# Patient Record
Sex: MALE | Race: WHITE | Employment: FULL TIME | URBAN - METROPOLITAN AREA
[De-identification: names, ages, dates, MRNs, and addresses within clinical notes are randomized per-mention and may not be internally consistent; named-entity substitution may affect disease eponyms.]

---

## 2022-12-20 ENCOUNTER — OFFICE VISIT (OUTPATIENT)
Dept: FAMILY MEDICINE | Facility: CLINIC | Age: 52
End: 2022-12-20
Payer: COMMERCIAL

## 2022-12-20 VITALS
SYSTOLIC BLOOD PRESSURE: 154 MMHG | BODY MASS INDEX: 35.04 KG/M2 | WEIGHT: 236.6 LBS | HEIGHT: 69 IN | OXYGEN SATURATION: 99 % | HEART RATE: 79 BPM | TEMPERATURE: 98.4 F | DIASTOLIC BLOOD PRESSURE: 96 MMHG

## 2022-12-20 DIAGNOSIS — E87.6 HYPOKALEMIA: ICD-10-CM

## 2022-12-20 DIAGNOSIS — E11.9 TYPE 2 DIABETES MELLITUS WITHOUT COMPLICATION, WITHOUT LONG-TERM CURRENT USE OF INSULIN (H): ICD-10-CM

## 2022-12-20 DIAGNOSIS — I63.9 ISCHEMIC STROKE (H): ICD-10-CM

## 2022-12-20 DIAGNOSIS — I63.9 CEREBROVASCULAR ACCIDENT (CVA), UNSPECIFIED MECHANISM (H): ICD-10-CM

## 2022-12-20 DIAGNOSIS — Z09 HOSPITAL DISCHARGE FOLLOW-UP: Primary | ICD-10-CM

## 2022-12-20 DIAGNOSIS — Z23 NEED FOR PROPHYLACTIC VACCINATION AND INOCULATION AGAINST INFLUENZA: ICD-10-CM

## 2022-12-20 DIAGNOSIS — I10 UNCONTROLLED HYPERTENSION: ICD-10-CM

## 2022-12-20 LAB
ANION GAP SERPL CALCULATED.3IONS-SCNC: 3 MMOL/L (ref 3–14)
BUN SERPL-MCNC: 17 MG/DL (ref 7–30)
CALCIUM SERPL-MCNC: 9.2 MG/DL (ref 8.5–10.1)
CHLORIDE BLD-SCNC: 108 MMOL/L (ref 94–109)
CO2 SERPL-SCNC: 27 MMOL/L (ref 20–32)
CREAT SERPL-MCNC: 0.8 MG/DL (ref 0.66–1.25)
GFR SERPL CREATININE-BSD FRML MDRD: >90 ML/MIN/1.73M2
GLUCOSE BLD-MCNC: 226 MG/DL (ref 70–99)
POTASSIUM BLD-SCNC: 4.2 MMOL/L (ref 3.4–5.3)
SODIUM SERPL-SCNC: 138 MMOL/L (ref 133–144)

## 2022-12-20 PROCEDURE — 80048 BASIC METABOLIC PNL TOTAL CA: CPT | Performed by: FAMILY MEDICINE

## 2022-12-20 PROCEDURE — 36415 COLL VENOUS BLD VENIPUNCTURE: CPT | Performed by: FAMILY MEDICINE

## 2022-12-20 PROCEDURE — 90471 IMMUNIZATION ADMIN: CPT | Performed by: FAMILY MEDICINE

## 2022-12-20 PROCEDURE — 90682 RIV4 VACC RECOMBINANT DNA IM: CPT | Performed by: FAMILY MEDICINE

## 2022-12-20 PROCEDURE — 99495 TRANSJ CARE MGMT MOD F2F 14D: CPT | Performed by: FAMILY MEDICINE

## 2022-12-20 RX ORDER — BLOOD SUGAR DIAGNOSTIC
STRIP MISCELLANEOUS
COMMUNITY
Start: 2022-12-16 | End: 2023-01-06

## 2022-12-20 RX ORDER — NIFEDIPINE 60 MG/1
60 TABLET, EXTENDED RELEASE ORAL
COMMUNITY
Start: 2022-12-18 | End: 2023-01-06

## 2022-12-20 RX ORDER — ASPIRIN 325 MG
325 TABLET ORAL
COMMUNITY
Start: 2022-12-18

## 2022-12-20 RX ORDER — ATORVASTATIN CALCIUM 80 MG/1
80 TABLET, FILM COATED ORAL AT BEDTIME
COMMUNITY
Start: 2022-12-17 | End: 2023-01-06

## 2022-12-20 RX ORDER — POTASSIUM CHLORIDE 1500 MG/1
40 TABLET, EXTENDED RELEASE ORAL 2 TIMES DAILY
COMMUNITY
Start: 2022-12-17 | End: 2023-01-06

## 2022-12-20 RX ORDER — LOSARTAN POTASSIUM 50 MG/1
50 TABLET ORAL DAILY
Qty: 90 TABLET | Refills: 0 | Status: SHIPPED | OUTPATIENT
Start: 2022-12-20 | End: 2023-01-06

## 2022-12-20 RX ORDER — CLOPIDOGREL BISULFATE 75 MG/1
75 TABLET ORAL EVERY MORNING
COMMUNITY
Start: 2022-12-18 | End: 2023-01-06

## 2022-12-20 RX ORDER — LOSARTAN POTASSIUM 25 MG/1
25 TABLET ORAL DAILY
COMMUNITY
Start: 2022-12-18 | End: 2022-12-20

## 2022-12-20 NOTE — PATIENT INSTRUCTIONS
Nikolai    It was a pleasure seeing you in clinic today.  Here's the plan:    Diabetes - increase insulin to 20U then increase by 2units every 2 days until fasting glucose is 130 or less.  Plan to increase metformin in about 2 weeks.  Referral to MTM (jennifer lemons)  Hypertension - increase losartan to 50mg once daily, continue to monitor twice daily, goal is less than 130/80  Neurology appointment  Cardiology referral ordered  Potassium check today  PT/OT/ST    Let me know if you have questions.    Ravi Prajapati MD

## 2022-12-20 NOTE — LETTER
December 22, 2022    Nikolai Kincaid  [phone]  [place] [address]          Dear ,    We are writing to inform you of your test results.    Your results are overall not concerning.        Resulted Orders   Basic metabolic panel  (Ca, Cl, CO2, Creat, Gluc, K, Na, BUN)   Result Value Ref Range    Sodium 138 133 - 144 mmol/L    Potassium 4.2 3.4 - 5.3 mmol/L    Chloride 108 94 - 109 mmol/L    Carbon Dioxide (CO2) 27 20 - 32 mmol/L    Anion Gap 3 3 - 14 mmol/L    Urea Nitrogen 17 7 - 30 mg/dL    Creatinine 0.80 0.66 - 1.25 mg/dL    Calcium 9.2 8.5 - 10.1 mg/dL    Glucose 226 (H) 70 - 99 mg/dL    GFR Estimate >90 >60 mL/min/1.73m2      Comment:      Effective December 21, 2021 eGFRcr in adults is calculated using the 2021 CKD-EPI creatinine equation which includes age and gender (Annabel et al., NEJM, DOI: 10.1056/OQXRsv7415464)       If you have any questions or concerns, please call the clinic at the number listed above.       Sincerely,      Ravi Oneill MD

## 2022-12-20 NOTE — PROGRESS NOTES
Assessment & Plan       ICD-10-CM    1. Uncontrolled hypertension  I10 Med Therapy Management Referral     Adult Cardiology Eval  Referral     losartan (COZAAR) 50 MG tablet      2. Type 2 diabetes mellitus without complication, without long-term current use of insulin (H)  E11.9 Med Therapy Management Referral      3. Hypokalemia  E87.6 Basic metabolic panel  (Ca, Cl, CO2, Creat, Gluc, K, Na, BUN)     Basic metabolic panel  (Ca, Cl, CO2, Creat, Gluc, K, Na, BUN)      4. Cerebrovascular accident (CVA), unspecified mechanism (H)  I63.9 Adult Cardiology Eval  Referral     CANCELED: Adult Neurology  Referral      5. Ischemic stroke (H)  I63.9 Adult Cardiology Eval  Referral      6. Need for prophylactic vaccination and inoculation against influenza  Z23 INFLUENZA QUAD, RECOMBINANT, P-FREE (RIV4) (FLUBLOK)            Review of external notes as documented elsewhere in note        Patient Instructions   Nikolai    It was a pleasure seeing you in clinic today.  Here's the plan:    1. Diabetes - increase insulin to 20U then increase by 2units every 2 days until fasting glucose is 130 or less.  Plan to increase metformin in about 2 weeks.  Referral to MTM (jennifer lemons)  2. Hypertension - increase losartan to 50mg once daily, continue to monitor twice daily, goal is less than 130/80  3. Neurology appointment  4. Cardiology referral ordered  5. Potassium check today  6. PT/OT/ST    Let me know if you have questions.    Ravi Prajapati MD        No follow-ups on file.    Ravi Prajapati MD  [facility]    Angela Menchaca is a 52 year old, presenting for the following health issues:  Hospital F/U      [facility]       Hospital Follow-up Visit:    Hospital/Nursing Home/IP Rehab Facility: [facility]  Date of Admission: 12/12/2022  Date of Discharge: 12/17/2022  Reason(s) for Admission: Memory loss    Was your hospitalization related to COVID-19? No   Problems taking  "medications regularly:  None  Medication changes since discharge:   Problems adhering to non-medication therapy:  None    Summary of hospitalization:  [facility] discharge summary reviewed  Diagnostic Tests/Treatments reviewed.  Follow up needed: none  Other Healthcare Providers Involved in Patient s Care:         None  Update since discharge: improved.       Nonischemic cardiomyopathy - EF 15-20? Repeat echo was normal  Diabetes - A1C 10.7 - lantus 14units, metformin 500mg BID  Fasting glucose low 200's  Hypertension - procardiaXL and losartan  Alcohol use  Stroke - PT/OT/SLP - needs coverage for speech - , plavix x 3 months, negative CIRO  Hypokalemia - needs recheck - 3.3 12/17    Work - will need letter to go back  Plan of care communicated with patient                 Review of Systems   Constitutional, HEENT, cardiovascular, pulmonary, gi and gu systems are negative, except as otherwise noted.      Objective    BP (!) 154/96   Pulse 79   Temp 98.4  F (36.9  C) (Oral)   Ht 1.753 m (5' 9\")   Wt 107.3 kg (236 lb 9.6 oz)   SpO2 99%   BMI 34.94 kg/m    Body mass index is 34.94 kg/m .  Physical Exam  Constitutional:       General: He is not in acute distress.     Appearance: Normal appearance. He is well-developed. He is not ill-appearing.   HENT:      Head: Normocephalic and atraumatic.      Right Ear: External ear normal.      Left Ear: External ear normal.      Nose: Nose normal.   Eyes:      General: No scleral icterus.     Extraocular Movements: Extraocular movements intact.      Conjunctiva/sclera: Conjunctivae normal.   Cardiovascular:      Rate and Rhythm: Normal rate.   Pulmonary:      Effort: Pulmonary effort is normal.   Musculoskeletal:      Cervical back: Normal range of motion and neck supple.   Skin:     General: Skin is warm and dry.   Neurological:      Mental Status: He is alert and oriented to person, place, and time.   Psychiatric:         Behavior: Behavior normal.    "      Thought Content: Thought content normal.         Judgment: Judgment normal.

## 2022-12-22 ENCOUNTER — TELEPHONE (OUTPATIENT)
Dept: FAMILY MEDICINE | Facility: CLINIC | Age: 52
End: 2022-12-22

## 2022-12-22 NOTE — TELEPHONE ENCOUNTER
MTM referral from: [facility] visit (referral by provider)    MTM referral outreach attempt #2 on December 22, 2022 at 3:56 PM      Outcome: Patient is not interested at this time because his insurance doesn't cover the appt, will route to MTM Pharmacist/Provider as an FYI. Thank you for the referral.     Yesenia Callaway [facility]

## 2022-12-27 ENCOUNTER — TELEPHONE (OUTPATIENT)
Dept: FAMILY MEDICINE | Facility: CLINIC | Age: 52
End: 2022-12-27

## 2022-12-27 NOTE — TELEPHONE ENCOUNTER
Spoke with Onelia, clinician calling from [facility] regarding recent lab results from 12/16.    She is inquiring regarding aldosterone labs and renin labs. Patient's aldosterone labs resulted as normal, however renin was abnormal. Labs faxed to [facility] team at [phone].    Onelia suggesting a consult for endocrinology or nephrology due to concern for potential hypoaldosteronism.     Please advise.     Krunal Hobbs RN  [facility]

## 2022-12-28 PROBLEM — I63.9 ACUTE CVA (CEREBROVASCULAR ACCIDENT) (H): Status: ACTIVE | Noted: 2022-12-12

## 2022-12-28 PROBLEM — E87.6 HYPOKALEMIA: Status: ACTIVE | Noted: 2022-12-17

## 2022-12-28 PROBLEM — E11.9 TYPE 2 DIABETES MELLITUS, WITHOUT LONG-TERM CURRENT USE OF INSULIN (H): Status: ACTIVE | Noted: 2022-12-12

## 2022-12-28 PROBLEM — E78.5 HYPERLIPIDEMIA WITH TARGET LDL LESS THAN 70: Status: ACTIVE | Noted: 2017-03-03

## 2022-12-28 PROBLEM — I16.1 HYPERTENSIVE EMERGENCY: Status: ACTIVE | Noted: 2022-12-12

## 2023-01-06 ENCOUNTER — OFFICE VISIT (OUTPATIENT)
Dept: FAMILY MEDICINE | Facility: CLINIC | Age: 53
End: 2023-01-06
Payer: COMMERCIAL

## 2023-01-06 VITALS
BODY MASS INDEX: 34.82 KG/M2 | HEART RATE: 86 BPM | TEMPERATURE: 98.4 F | DIASTOLIC BLOOD PRESSURE: 86 MMHG | OXYGEN SATURATION: 97 % | WEIGHT: 235.8 LBS | SYSTOLIC BLOOD PRESSURE: 138 MMHG

## 2023-01-06 DIAGNOSIS — E11.59 TYPE 2 DIABETES MELLITUS WITH OTHER CIRCULATORY COMPLICATION, WITHOUT LONG-TERM CURRENT USE OF INSULIN (H): ICD-10-CM

## 2023-01-06 DIAGNOSIS — Z12.11 SCREEN FOR COLON CANCER: ICD-10-CM

## 2023-01-06 DIAGNOSIS — E11.59 TYPE 2 DIABETES MELLITUS WITH OTHER CIRCULATORY COMPLICATION, WITHOUT LONG-TERM CURRENT USE OF INSULIN (H): Primary | ICD-10-CM

## 2023-01-06 DIAGNOSIS — Z11.4 SCREENING FOR HIV (HUMAN IMMUNODEFICIENCY VIRUS): ICD-10-CM

## 2023-01-06 DIAGNOSIS — I10 UNCONTROLLED HYPERTENSION: ICD-10-CM

## 2023-01-06 DIAGNOSIS — Z23 HIGH PRIORITY FOR 2019-NCOV VACCINE: ICD-10-CM

## 2023-01-06 DIAGNOSIS — Z11.59 NEED FOR HEPATITIS C SCREENING TEST: ICD-10-CM

## 2023-01-06 LAB
CREAT UR-MCNC: 234 MG/DL
MICROALBUMIN UR-MCNC: 209 MG/L
MICROALBUMIN/CREAT UR: 89.32 MG/G CR (ref 0–17)

## 2023-01-06 PROCEDURE — 82043 UR ALBUMIN QUANTITATIVE: CPT | Performed by: FAMILY MEDICINE

## 2023-01-06 PROCEDURE — 91312 COVID-19 VACCINE BIVALENT BOOSTER 12+ (PFIZER): CPT | Performed by: FAMILY MEDICINE

## 2023-01-06 PROCEDURE — 82570 ASSAY OF URINE CREATININE: CPT | Performed by: FAMILY MEDICINE

## 2023-01-06 PROCEDURE — 99214 OFFICE O/P EST MOD 30 MIN: CPT | Mod: 25 | Performed by: FAMILY MEDICINE

## 2023-01-06 PROCEDURE — 0124A COVID-19 VACCINE BIVALENT BOOSTER 12+ (PFIZER): CPT | Performed by: FAMILY MEDICINE

## 2023-01-06 RX ORDER — NIFEDIPINE 60 MG/1
60 TABLET, EXTENDED RELEASE ORAL
Qty: 90 TABLET | Refills: 1 | Status: SHIPPED | OUTPATIENT
Start: 2023-01-06 | End: 2023-07-05

## 2023-01-06 RX ORDER — POTASSIUM CHLORIDE 1500 MG/1
40 TABLET, EXTENDED RELEASE ORAL 2 TIMES DAILY
Qty: 180 TABLET | Refills: 1 | Status: SHIPPED | OUTPATIENT
Start: 2023-01-06 | End: 2023-04-17

## 2023-01-06 RX ORDER — ATORVASTATIN CALCIUM 80 MG/1
80 TABLET, FILM COATED ORAL AT BEDTIME
Qty: 90 TABLET | Refills: 1 | Status: SHIPPED | OUTPATIENT
Start: 2023-01-06 | End: 2023-07-11

## 2023-01-06 RX ORDER — LANCETS
EACH MISCELLANEOUS
Qty: 1 EACH | Refills: 3 | Status: SHIPPED | OUTPATIENT
Start: 2023-01-06 | End: 2024-09-23

## 2023-01-06 RX ORDER — LOSARTAN POTASSIUM 50 MG/1
50 TABLET ORAL DAILY
Qty: 90 TABLET | Refills: 1 | Status: SHIPPED | OUTPATIENT
Start: 2023-01-06 | End: 2023-04-05

## 2023-01-06 RX ORDER — CLOPIDOGREL BISULFATE 75 MG/1
75 TABLET ORAL EVERY MORNING
Qty: 90 TABLET | Refills: 1 | Status: SHIPPED | OUTPATIENT
Start: 2023-01-06 | End: 2023-07-11

## 2023-01-06 RX ORDER — BLOOD SUGAR DIAGNOSTIC
STRIP MISCELLANEOUS
Qty: 400 STRIP | Refills: 1 | Status: SHIPPED | OUTPATIENT
Start: 2023-01-06

## 2023-01-06 RX ORDER — GLUCOSAMINE HCL/CHONDROITIN SU 500-400 MG
CAPSULE ORAL
Qty: 100 EACH | Refills: 3 | Status: SHIPPED | OUTPATIENT
Start: 2023-01-06

## 2023-01-06 NOTE — LETTER
January 9, 2023    Nikolai Kincaid  [address]  [place] [address]          Dear ,    We are writing to inform you of your test results.  Urine albumin is slightly elevated, continue to monitor yearly.       Resulted Orders   Albumin Random Urine Quantitative with Creat Ratio   Result Value Ref Range    Creatinine Urine mg/dL 234 mg/dL    Albumin Urine mg/L 209 mg/L    Albumin Urine mg/g Cr 89.32 (H) 0.00 - 17.00 mg/g Cr       If you have any questions or concerns, please call the clinic at the number listed above.       Sincerely,      Ravi Oneill MD

## 2023-01-06 NOTE — LETTER
January 6, 2023      Nikolai Kincaid  [address]  [place] [address]        To Whom It May Concern:      Nikolai Kincaid was seen in our clinic. He may return to work full-time without restrictions.      Sincerely,        Ravi Prajapati MD

## 2023-01-06 NOTE — PROGRESS NOTES
"  Assessment & Plan       ICD-10-CM    1. Type 2 diabetes mellitus with other circulatory complication, without long-term current use of insulin (H)  E11.59 Albumin Random Urine Quantitative with Creat Ratio     REVIEW OF HEALTH MAINTENANCE PROTOCOL ORDERS     Adult Eye  Referral     atorvastatin (LIPITOR) 80 MG tablet     clopidogrel (PLAVIX) 75 MG tablet     losartan (COZAAR) 50 MG tablet     metFORMIN (GLUCOPHAGE) 1000 MG tablet     NIFEdipine ER OSMOTIC (PROCARDIA XL) 60 MG 24 hr tablet     potassium chloride ER (KLOR-CON M) 20 MEQ CR tablet     COLOGUARD(EXACT SCIENCES)     Med Therapy Management Referral     blood glucose (OPTIUM TEST STRIPS) test strip     Albumin Random Urine Quantitative with Creat Ratio      2. Screen for colon cancer  Z12.11       3. Screening for HIV (human immunodeficiency virus)  Z11.4       4. Need for hepatitis C screening test  Z11.59       5. Uncontrolled hypertension  I10 Albumin Random Urine Quantitative with Creat Ratio     REVIEW OF HEALTH MAINTENANCE PROTOCOL ORDERS     Adult Eye  Referral     atorvastatin (LIPITOR) 80 MG tablet     clopidogrel (PLAVIX) 75 MG tablet     losartan (COZAAR) 50 MG tablet     metFORMIN (GLUCOPHAGE) 1000 MG tablet     NIFEdipine ER OSMOTIC (PROCARDIA XL) 60 MG 24 hr tablet     potassium chloride ER (KLOR-CON M) 20 MEQ CR tablet     COLOGUARD(EXACT SCIENCES)     Med Therapy Management Referral     blood glucose (OPTIUM TEST STRIPS) test strip     thin (NO BRAND SPECIFIED) lancets     alcohol swab prep pads     Albumin Random Urine Quantitative with Creat Ratio      6. High priority for 2019-nCoV vaccine  Z23 COVID-19,PF,PFIZER BOOSTER BIVALENT 12+Yrs                     BMI:   Estimated body mass index is 34.82 kg/m  as calculated from the following:    Height as of 12/20/22: 1.753 m (5' 9\").    Weight as of this encounter: 107 kg (235 lb 12.8 oz).           No follow-ups on file.    Ravi Prajapati MD  [facility] " YOGESH Menchaca is a 52 year old, presenting for the following health issues:  Diabetes and Patient Request (Discuss going back to work)      History of Present Illness       Diabetes:   He presents for follow up of diabetes.  He is checking home blood glucose three times daily. He checks blood glucose before and after meals and at bedtime.  Blood glucose is sometimes over 200 and never under 70. When his blood glucose is low, the patient is asymptomatic for confusion, blurred vision, lethargy and reports not feeling dizzy, shaky, or weak.  He is concerned about blood sugar frequently over 200.  He is not experiencing numbness or burning in feet, excessive thirst, blurry vision, weight changes or redness, sores or blisters on feet. The patient has not had a diabetic eye exam in the last 12 months.         Hypertension: He presents for follow up of hypertension.  He does not check blood pressure  regularly outside of the clinic. Outside blood pressures have been over 140/90. He follows a low salt diet.       Diabetes  26 units  Fasting this morning wa 176  Gradual increase          Review of Systems   Constitutional, HEENT, cardiovascular, pulmonary, gi and gu systems are negative, except as otherwise noted.      Objective    BP (!) 156/99   Pulse 86   Temp 98.4  F (36.9  C) (Oral)   Wt 107 kg (235 lb 12.8 oz)   SpO2 97%   BMI 34.82 kg/m    Body mass index is 34.82 kg/m .  Physical Exam  Constitutional:       General: He is not in acute distress.     Appearance: Normal appearance. He is well-developed. He is not ill-appearing.   HENT:      Head: Normocephalic and atraumatic.      Right Ear: External ear normal.      Left Ear: External ear normal.      Nose: Nose normal.   Eyes:      General: No scleral icterus.     Extraocular Movements: Extraocular movements intact.      Conjunctiva/sclera: Conjunctivae normal.   Cardiovascular:      Rate and Rhythm: Normal rate.   Pulmonary:      Effort:  Pulmonary effort is normal.   Musculoskeletal:      Cervical back: Normal range of motion and neck supple.   Skin:     General: Skin is warm and dry.   Neurological:      Mental Status: He is alert and oriented to person, place, and time.   Psychiatric:         Behavior: Behavior normal.         Thought Content: Thought content normal.         Judgment: Judgment normal.

## 2023-01-10 ENCOUNTER — TELEPHONE (OUTPATIENT)
Dept: FAMILY MEDICINE | Facility: CLINIC | Age: 53
End: 2023-01-10

## 2023-01-10 NOTE — TELEPHONE ENCOUNTER
MTM referral from: [facility] visit (referral by provider)    MTM referral outreach attempt #1 on January 10, 2023 at 12:16 PM      Outcome: Patient declined due to private pay, will route to MTM Pharmacist/Provider as an FYI. Thank you for the referral.     Jackson Rodríguez, MTM coordinator    Reason for Referral:   diabetes

## 2023-01-11 NOTE — TELEPHONE ENCOUNTER
Hi Dr. Oneill - I won't be seeing him (see below), but can consider addition of Rybelsus or Victoza (do the same time every day with Lantus), these both appear to be on his formulary, although I don't know if he'll have a deductible.  Appears Ozempic, Trulicity and Jardiance are also on formulary for him.    Thanks!  Mary Ann Mayer, PharmD  Medication Therapy Management Pharmacist  [phone]

## 2023-03-14 ENCOUNTER — TELEPHONE (OUTPATIENT)
Dept: FAMILY MEDICINE | Facility: CLINIC | Age: 53
End: 2023-03-14
Payer: COMMERCIAL

## 2023-03-14 NOTE — TELEPHONE ENCOUNTER
Stroke NP saw pt yesterday for follow up.     176/100 - lowest BP that was taken after numerous attempts. Asymptomatic.     Current BP meds: losartan 50mg daily; nifedipine ER 60mg daily; as well as clopidogrel 75mg daily and ASA 325mg daily.     Had an ischemic stroke 12/12/22    Giving an update to see if you would like a med adjustment or to see patient.     Kavya Manuel RN  [facility]

## 2023-03-21 NOTE — TELEPHONE ENCOUNTER
Pam was notified of provider's message as written.     Left message on answering machine for patient to call back to the nurse at [phone].     Need to notify pt of Dr. Prajapati's message and assist with scheduling appt.    Freya Bills RN  [facility]

## 2023-03-21 NOTE — TELEPHONE ENCOUNTER
Need to see patient.  His bp in January was normal.  Recommend checking his own pressure twice daily and recording until follow-up visit.    Ravi Prajapati MD

## 2023-03-22 ENCOUNTER — NURSE TRIAGE (OUTPATIENT)
Dept: NURSING | Facility: CLINIC | Age: 53
End: 2023-03-22
Payer: COMMERCIAL

## 2023-03-22 NOTE — TELEPHONE ENCOUNTER
Nikolai calls and says that he is returning his clinic's call. RN checked Epic and told pt. The reason for the call. RN then conferenced pt., with [facility] , for assistance in scheduling an appointment. COVID 19 Nurse Triage Plan/Patient Instructions    Please be aware that novel coronavirus (COVID-19) may be circulating in the community. If you develop symptoms such as fever, cough, or SOB or if you have concerns about the presence of another infection including coronavirus (COVID-19), please contact your health care provider or visit [email or web address].     Disposition/Instructions    Home care recommended. Follow home care protocol based instructions.    Thank you for taking steps to prevent the spread of this virus.  o Limit your contact with others.  o Wear a simple mask to cover your cough.  o Wash your hands well and often.    Resources    [facility]: About COVID-19: [email or web address]    CDC: What to Do If You're Sick: www.cdc.gov/coronavirus/2019-ncov/about/steps-when-sick.html    CDC: Ending Home Isolation: www.cdc.gov/coronavirus/2019-ncov/hcp/disposition-in-home-patients.html     CDC: Caring for Someone: www.cdc.gov/coronavirus/2019-ncov/if-you-are-sick/care-for-someone.html     [facility]: Interim Guidance for Hospital Discharge to Home: www.health.[facility].[place].us/diseases/coronavirus/hcp/hospdischarge.pdf    [facility] clinical trials (COVID-19 research studies): clinicalaffairs.[facility]/n-clinical-trials     Below are the COVID-19 hotlines at the [facility] of Health ([facility]). Interpreters are available.   o For health questions: Call 725-356-9012 or 1-940.452.1139 (7 a.m. to 7 p.m.)  o For questions about schools and childcare: Call 463-995-3470 or 1-418.949.7233 (7 a.m. to 7 p.m.)                     Reason for Disposition    [1] Follow-up call to recent contact AND [2] information only call, no triage required    Additional Information    Negative: [1]  Caller is not with the adult (patient) AND [2] reporting urgent symptoms    Negative: Lab result questions    Negative: Medication questions    Negative: Caller can't be reached by phone    Negative: Caller has already spoken to PCP or another triager    Negative: RN needs further essential information from caller in order to complete triage    Negative: Requesting regular office appointment    Negative: [1] Caller requesting NON-URGENT health information AND [2] PCP's office is the best resource    Negative: Health Information question, no triage required and triager able to answer question    Negative: General information question, no triage required and triager able to answer question    Negative: Question about upcoming scheduled test, no triage required and triager able to answer question    Negative: [1] Caller is not with the adult (patient) AND [2] probable NON-URGENT symptoms    Protocols used: INFORMATION ONLY CALL - NO TRIAGE[facility]

## 2023-03-22 NOTE — TELEPHONE ENCOUNTER
2nd attempt to reach.    Left message on answering machine for patient to call back to the nurse at [phone].    Freya Bills RN  [facility]

## 2023-03-23 NOTE — TELEPHONE ENCOUNTER
"3rd attempt. Called patient. Left voice message to return call at [phone].    When patient returns call, need to notify him of care advise from PCP:    \"Need to see patient.  His bp in January was normal.  Recommend checking his own pressure twice daily and recording until follow-up visit.  Ravi Prajapati MD\"    Mary Bernal RN   [facility]  "

## 2023-03-28 NOTE — TELEPHONE ENCOUNTER
"Attempted to contact patient x3. No response.     Please send letter with message:    \"We need to see patient in clinic regarding his recent blood pressure readings. His bp in January was normal.  Recommend checking his own pressure twice daily and recording until follow-up visit.\"    Kavya Manuel RN  [facility]    "

## 2023-04-05 ENCOUNTER — TELEPHONE (OUTPATIENT)
Dept: FAMILY MEDICINE | Facility: CLINIC | Age: 53
End: 2023-04-05
Payer: COMMERCIAL

## 2023-04-05 DIAGNOSIS — E11.59 TYPE 2 DIABETES MELLITUS WITH OTHER CIRCULATORY COMPLICATION, WITHOUT LONG-TERM CURRENT USE OF INSULIN (H): ICD-10-CM

## 2023-04-05 DIAGNOSIS — I10 UNCONTROLLED HYPERTENSION: ICD-10-CM

## 2023-04-05 RX ORDER — LOSARTAN POTASSIUM 50 MG/1
50 TABLET ORAL DAILY
Qty: 90 TABLET | Refills: 1 | Status: SHIPPED | OUTPATIENT
Start: 2023-04-05 | End: 2023-10-20

## 2023-04-05 NOTE — TELEPHONE ENCOUNTER
Dr. Oneill     Patient and spouse called requesting refills of meds. Per pt using 28 units of insulin now. meds and pharm cued.       Team can reach Teri at , per pt ok to talk to spouse.       Thanks,  SHANNAN Bob  [facility]

## 2023-04-07 NOTE — TELEPHONE ENCOUNTER
Losartan was refilled.  Please schedule a visit with Ravi Jimenez to discuss refills and dosing on the Brendon Carmona RN  [facility]

## 2023-04-07 NOTE — TELEPHONE ENCOUNTER
I called and left a voicemail for the patient to give the clinic a call back at [phone] regarding his medication request. If the patient calls back please assist with making a medication check appointment with Dr. Oneill.    Beth Barrow [facility]

## 2023-04-11 NOTE — TELEPHONE ENCOUNTER
We have not seen forms see 03/30/23 encounter should we schedule a pre-op.  Paola Corbett   [facility]

## 2023-04-13 ENCOUNTER — TELEPHONE (OUTPATIENT)
Dept: FAMILY MEDICINE | Facility: CLINIC | Age: 53
End: 2023-04-13
Payer: COMMERCIAL

## 2023-04-13 DIAGNOSIS — E11.59 TYPE 2 DIABETES MELLITUS WITH OTHER CIRCULATORY COMPLICATION, WITHOUT LONG-TERM CURRENT USE OF INSULIN (H): Primary | ICD-10-CM

## 2023-04-13 NOTE — TELEPHONE ENCOUNTER
PRIOR AUTHORIZATION DENIED    Medication: insulin glargine (LANTUS PEN) 100 UNIT/ML pen - EPA DENIED    Denial Date: 4/13/2023    Denial Rational:       Appeal Information:

## 2023-04-17 DIAGNOSIS — I10 UNCONTROLLED HYPERTENSION: ICD-10-CM

## 2023-04-17 DIAGNOSIS — E11.59 TYPE 2 DIABETES MELLITUS WITH OTHER CIRCULATORY COMPLICATION, WITHOUT LONG-TERM CURRENT USE OF INSULIN (H): ICD-10-CM

## 2023-04-17 RX ORDER — POTASSIUM CHLORIDE 1500 MG/1
TABLET, EXTENDED RELEASE ORAL
Qty: 360 TABLET | Refills: 0 | Status: SHIPPED | OUTPATIENT
Start: 2023-04-17 | End: 2023-07-18

## 2023-04-18 NOTE — TELEPHONE ENCOUNTER
Ravi Prajapati MD  to  Rn Triage [place]    MM      4/14/23 10:28 AM  Please notify patient that lantus isn't covered and I have referred him to MTM to help with both coverage and diabetes management.     Ravi Prajapati MD      Pt notified and was given number to call to schedule.    Freya Bills RN  [facility]

## 2023-04-21 ENCOUNTER — TELEPHONE (OUTPATIENT)
Dept: FAMILY MEDICINE | Facility: CLINIC | Age: 53
End: 2023-04-21
Payer: COMMERCIAL

## 2023-04-21 DIAGNOSIS — E11.59 TYPE 2 DIABETES MELLITUS WITH OTHER CIRCULATORY COMPLICATION, WITHOUT LONG-TERM CURRENT USE OF INSULIN (H): Primary | ICD-10-CM

## 2023-04-21 NOTE — TELEPHONE ENCOUNTER
MT referral from: [facility] visit (referral by provider) for diabetes management, medication coverage    MT referral outreach attempt #2 on April 21, 2023 at 11:09 AM      Outcome: Patient not reachable after several attempts with voice messages left, will route to [facility] Pharmacist/Provider as an FYI.  [facility] scheduling number is [phone].  Thank you for the referral.    Nakia Melendez, [facility]     Patient has Private Pay coverage

## 2023-04-24 RX ORDER — INSULIN GLARGINE 100 [IU]/ML
28 INJECTION, SOLUTION SUBCUTANEOUS AT BEDTIME
Qty: 15 ML | Refills: 2 | Status: SHIPPED | OUTPATIENT
Start: 2023-04-24 | End: 2024-02-15

## 2023-04-24 NOTE — TELEPHONE ENCOUNTER
No MTM coverage - however it appears the basaglar or Levemir are covered for him (Lantus isn't) - could contact pharmacy and have them reprocess Lantus for basaglar, or send new prescription for basaglar or Levemir, unit for unit dose conversion.     Mary Ann Mayer, PharmD  Medication Therapy Management Pharmacist  [phone]

## 2023-04-24 NOTE — TELEPHONE ENCOUNTER
Attempted to call pt, left vm stating insulin covered by insurance was sent to pharm for patient on confidential line.     Thanks,  SHANNAN Bob  [facility]

## 2023-07-04 DIAGNOSIS — I10 UNCONTROLLED HYPERTENSION: ICD-10-CM

## 2023-07-04 DIAGNOSIS — E11.59 TYPE 2 DIABETES MELLITUS WITH OTHER CIRCULATORY COMPLICATION, WITHOUT LONG-TERM CURRENT USE OF INSULIN (H): ICD-10-CM

## 2023-07-05 RX ORDER — NIFEDIPINE 60 MG/1
TABLET, EXTENDED RELEASE ORAL
Qty: 90 TABLET | Refills: 1 | Status: SHIPPED | OUTPATIENT
Start: 2023-07-05 | End: 2024-02-08

## 2023-07-07 DIAGNOSIS — E11.59 TYPE 2 DIABETES MELLITUS WITH OTHER CIRCULATORY COMPLICATION, WITHOUT LONG-TERM CURRENT USE OF INSULIN (H): ICD-10-CM

## 2023-07-07 DIAGNOSIS — I10 UNCONTROLLED HYPERTENSION: ICD-10-CM

## 2023-07-11 RX ORDER — ATORVASTATIN CALCIUM 80 MG/1
TABLET, FILM COATED ORAL
Qty: 90 TABLET | Refills: 1 | Status: SHIPPED | OUTPATIENT
Start: 2023-07-11 | End: 2024-02-08

## 2023-07-11 RX ORDER — CLOPIDOGREL BISULFATE 75 MG/1
TABLET ORAL
Qty: 90 TABLET | Refills: 1 | Status: SHIPPED | OUTPATIENT
Start: 2023-07-11 | End: 2024-02-13

## 2023-08-15 DIAGNOSIS — E11.59 TYPE 2 DIABETES MELLITUS WITH OTHER CIRCULATORY COMPLICATION, WITHOUT LONG-TERM CURRENT USE OF INSULIN (H): ICD-10-CM

## 2023-09-20 DIAGNOSIS — E11.59 TYPE 2 DIABETES MELLITUS WITH OTHER CIRCULATORY COMPLICATION, WITHOUT LONG-TERM CURRENT USE OF INSULIN (H): Primary | ICD-10-CM

## 2023-09-21 RX ORDER — PEN NEEDLE, DIABETIC 32GX 5/32"
NEEDLE, DISPOSABLE MISCELLANEOUS DAILY
Qty: 100 EACH | Refills: 3 | Status: SHIPPED | OUTPATIENT
Start: 2023-09-21 | End: 2024-02-15

## 2023-09-21 RX ORDER — PEN NEEDLE, DIABETIC 32GX 5/32"
NEEDLE, DISPOSABLE MISCELLANEOUS DAILY
COMMUNITY
Start: 2023-04-22

## 2023-10-20 DIAGNOSIS — I10 UNCONTROLLED HYPERTENSION: ICD-10-CM

## 2023-10-20 DIAGNOSIS — E11.59 TYPE 2 DIABETES MELLITUS WITH OTHER CIRCULATORY COMPLICATION, WITHOUT LONG-TERM CURRENT USE OF INSULIN (H): ICD-10-CM

## 2023-10-20 RX ORDER — LOSARTAN POTASSIUM 50 MG/1
50 TABLET ORAL DAILY
Qty: 90 TABLET | Refills: 1 | Status: SHIPPED | OUTPATIENT
Start: 2023-10-20 | End: 2024-02-08

## 2023-11-13 ENCOUNTER — TELEPHONE (OUTPATIENT)
Dept: FAMILY MEDICINE | Facility: CLINIC | Age: 53
End: 2023-11-13

## 2024-01-18 DIAGNOSIS — E11.59 TYPE 2 DIABETES MELLITUS WITH OTHER CIRCULATORY COMPLICATION, WITHOUT LONG-TERM CURRENT USE OF INSULIN (H): ICD-10-CM

## 2024-01-19 NOTE — TELEPHONE ENCOUNTER
Left message informing pt that he needs to schedule an appt for refill.  Shane Desai MA on 1/19/2024 at 1:58 PM

## 2024-02-04 ENCOUNTER — TELEPHONE (OUTPATIENT)
Dept: FAMILY MEDICINE | Facility: CLINIC | Age: 54
End: 2024-02-04

## 2024-02-04 DIAGNOSIS — E11.59 TYPE 2 DIABETES MELLITUS WITH OTHER CIRCULATORY COMPLICATION, WITHOUT LONG-TERM CURRENT USE OF INSULIN (H): ICD-10-CM

## 2024-02-04 DIAGNOSIS — I10 UNCONTROLLED HYPERTENSION: ICD-10-CM

## 2024-02-07 RX ORDER — POTASSIUM CHLORIDE 1500 MG/1
TABLET, EXTENDED RELEASE ORAL
Qty: 360 TABLET | Refills: 0 | OUTPATIENT
Start: 2024-02-07

## 2024-02-07 NOTE — TELEPHONE ENCOUNTER
Please notify patient of provider's message and ask him to keep appointment scheduled for tomorrow    Ariadne Carmona RN  [facility]

## 2024-02-08 ENCOUNTER — OFFICE VISIT (OUTPATIENT)
Dept: FAMILY MEDICINE | Facility: CLINIC | Age: 54
End: 2024-02-08
Payer: COMMERCIAL

## 2024-02-08 VITALS
WEIGHT: 231 LBS | HEART RATE: 103 BPM | OXYGEN SATURATION: 96 % | BODY MASS INDEX: 34.11 KG/M2 | SYSTOLIC BLOOD PRESSURE: 147 MMHG | DIASTOLIC BLOOD PRESSURE: 87 MMHG | TEMPERATURE: 98 F

## 2024-02-08 DIAGNOSIS — Z11.59 NEED FOR HEPATITIS C SCREENING TEST: ICD-10-CM

## 2024-02-08 DIAGNOSIS — I10 UNCONTROLLED HYPERTENSION: ICD-10-CM

## 2024-02-08 DIAGNOSIS — Z11.4 SCREENING FOR HIV (HUMAN IMMUNODEFICIENCY VIRUS): ICD-10-CM

## 2024-02-08 DIAGNOSIS — E11.59 TYPE 2 DIABETES MELLITUS WITH OTHER CIRCULATORY COMPLICATION, WITHOUT LONG-TERM CURRENT USE OF INSULIN (H): ICD-10-CM

## 2024-02-08 DIAGNOSIS — I16.1 HYPERTENSIVE EMERGENCY: ICD-10-CM

## 2024-02-08 DIAGNOSIS — E11.9 TYPE 2 DIABETES MELLITUS WITHOUT COMPLICATION, WITHOUT LONG-TERM CURRENT USE OF INSULIN (H): Primary | ICD-10-CM

## 2024-02-08 DIAGNOSIS — Z12.11 SCREEN FOR COLON CANCER: ICD-10-CM

## 2024-02-08 LAB — HBA1C MFR BLD: 10.5 % (ref 0–5.6)

## 2024-02-08 PROCEDURE — 83036 HEMOGLOBIN GLYCOSYLATED A1C: CPT | Performed by: FAMILY MEDICINE

## 2024-02-08 PROCEDURE — 80053 COMPREHEN METABOLIC PANEL: CPT | Performed by: FAMILY MEDICINE

## 2024-02-08 PROCEDURE — 82570 ASSAY OF URINE CREATININE: CPT | Performed by: FAMILY MEDICINE

## 2024-02-08 PROCEDURE — 36415 COLL VENOUS BLD VENIPUNCTURE: CPT | Performed by: FAMILY MEDICINE

## 2024-02-08 PROCEDURE — 80061 LIPID PANEL: CPT | Performed by: FAMILY MEDICINE

## 2024-02-08 PROCEDURE — 82043 UR ALBUMIN QUANTITATIVE: CPT | Performed by: FAMILY MEDICINE

## 2024-02-08 PROCEDURE — 99214 OFFICE O/P EST MOD 30 MIN: CPT | Performed by: FAMILY MEDICINE

## 2024-02-08 RX ORDER — NIFEDIPINE 60 MG/1
60 TABLET, EXTENDED RELEASE ORAL
Qty: 90 TABLET | Refills: 1 | Status: SHIPPED | OUTPATIENT
Start: 2024-02-08 | End: 2024-08-27

## 2024-02-08 RX ORDER — ATORVASTATIN CALCIUM 80 MG/1
80 TABLET, FILM COATED ORAL AT BEDTIME
Qty: 90 TABLET | Refills: 1 | Status: SHIPPED | OUTPATIENT
Start: 2024-02-08 | End: 2024-09-23

## 2024-02-08 RX ORDER — POTASSIUM CHLORIDE 1500 MG/1
40 TABLET, EXTENDED RELEASE ORAL 2 TIMES DAILY
Qty: 360 TABLET | Refills: 1 | Status: SHIPPED | OUTPATIENT
Start: 2024-02-08

## 2024-02-08 RX ORDER — LOSARTAN POTASSIUM 50 MG/1
50 TABLET ORAL DAILY
Qty: 90 TABLET | Refills: 1 | Status: CANCELLED | OUTPATIENT
Start: 2024-02-08

## 2024-02-08 RX ORDER — LOSARTAN POTASSIUM 100 MG/1
100 TABLET ORAL DAILY
Qty: 90 TABLET | Refills: 1 | Status: SHIPPED | OUTPATIENT
Start: 2024-02-08 | End: 2024-09-23

## 2024-02-08 NOTE — PROGRESS NOTES
Assessment & Plan       ICD-10-CM    1. Type 2 diabetes mellitus without complication, without long-term current use of insulin (H)  E11.9 HEMOGLOBIN A1C     Lipid panel reflex to direct LDL Non-fasting     Albumin Random Urine Quantitative with Creat Ratio     Continuous Blood Gluc  (FREESTYLE MIRELLA 2 READER) ILYA     Continuous Blood Gluc Sensor (FREESTYLE MIRELLA 2 SENSOR) MISC     HEMOGLOBIN A1C     Lipid panel reflex to direct LDL Non-fasting     Albumin Random Urine Quantitative with Creat Ratio      2. Type 2 diabetes mellitus with other circulatory complication, without long-term current use of insulin (H)  E11.59 potassium chloride ER (KLOR-CON M) 20 MEQ CR tablet     metFORMIN (GLUCOPHAGE) 1000 MG tablet     NIFEdipine ER OSMOTIC (PROCARDIA XL) 60 MG 24 hr tablet     atorvastatin (LIPITOR) 80 MG tablet     losartan (COZAAR) 100 MG tablet     Comprehensive metabolic panel     Comprehensive metabolic panel      3. Uncontrolled hypertension  I10 potassium chloride ER (KLOR-CON M) 20 MEQ CR tablet     NIFEdipine ER OSMOTIC (PROCARDIA XL) 60 MG 24 hr tablet     atorvastatin (LIPITOR) 80 MG tablet     losartan (COZAAR) 100 MG tablet      4. Screen for colon cancer  Z12.11       5. Screening for HIV (human immunodeficiency virus)  Z11.4       6. Need for hepatitis C screening test  Z11.59       7. Hypertensive emergency  I16.1             Review of external notes as documented elsewhere in note          There are no Patient Instructions on file for this visit.    Angela Menchaca is a 53 year old, presenting for the following health issues:  Recheck Medication (refills)        2/8/2024     2:54 PM   Additional Questions   Roomed by enid     History of Present Illness       Diabetes:   He presents for follow up of diabetes.    He is not checking blood glucose.         He has no concerns regarding his diabetes at this time.   He is not experiencing numbness or burning in feet, excessive thirst, blurry  vision, weight changes or redness, sores or blisters on feet. The patient has not had a diabetic eye exam in the last 12 months.          Hypertension: He presents for follow up of hypertension.  He does not check blood pressure  regularly outside of the clinic. Outpatient blood pressures have not been over 140/90. He follows a low salt diet.     He eats 0-1 servings of fruits and vegetables daily.He consumes 3 sweetened beverage(s) daily.He exercises with enough effort to increase his heart rate 9 or less minutes per day.  He exercises with enough effort to increase his heart rate 3 or less days per week. He is missing 1 dose(s) of medications per week.  He is not taking prescribed medications regularly due to remembering to take.     Bp at home is consistent with today's pressure                Review of Systems  Constitutional, HEENT, cardiovascular, pulmonary, gi and gu systems are negative, except as otherwise noted.      Objective    BP (!) 147/87   Pulse 103   Temp 98  F (36.7  C) (Temporal)   Wt 104.8 kg (231 lb)   SpO2 96%   BMI 34.11 kg/m    Body mass index is 34.11 kg/m .  Physical Exam  Vitals reviewed.   Constitutional:       Appearance: Normal appearance. He is not ill-appearing.   HENT:      Head: Normocephalic.      Right Ear: Tympanic membrane and ear canal normal.      Left Ear: Tympanic membrane and ear canal normal.      Nose: Nose normal.   Eyes:      Extraocular Movements: Extraocular movements intact.   Cardiovascular:      Rate and Rhythm: Normal rate and regular rhythm.      Heart sounds: Normal heart sounds. No murmur heard.  Pulmonary:      Effort: Pulmonary effort is normal. No respiratory distress.      Breath sounds: Normal breath sounds. No wheezing or rales.   Abdominal:      Palpations: Abdomen is soft.   Musculoskeletal:         General: Normal range of motion.      Cervical back: Normal range of motion and neck supple.   Skin:     General: Skin is warm and dry.      Findings:  No lesion.   Neurological:      Mental Status: He is alert and oriented to person, place, and time.   Psychiatric:         Mood and Affect: Mood normal.         Behavior: Behavior normal.         Thought Content: Thought content normal.         Judgment: Judgment normal.                    Signed Electronically by: Ravi Prajapati MD

## 2024-02-09 LAB
ALBUMIN SERPL BCG-MCNC: 4.3 G/DL (ref 3.5–5.2)
ALP SERPL-CCNC: 135 U/L (ref 40–150)
ALT SERPL W P-5'-P-CCNC: 34 U/L (ref 0–70)
ANION GAP SERPL CALCULATED.3IONS-SCNC: 11 MMOL/L (ref 7–15)
AST SERPL W P-5'-P-CCNC: 19 U/L (ref 0–45)
BILIRUB SERPL-MCNC: 0.6 MG/DL
BUN SERPL-MCNC: 13.3 MG/DL (ref 6–20)
CALCIUM SERPL-MCNC: 8.8 MG/DL (ref 8.6–10)
CHLORIDE SERPL-SCNC: 104 MMOL/L (ref 98–107)
CHOLEST SERPL-MCNC: 155 MG/DL
CREAT SERPL-MCNC: 0.8 MG/DL (ref 0.67–1.17)
CREAT UR-MCNC: 140 MG/DL
DEPRECATED HCO3 PLAS-SCNC: 23 MMOL/L (ref 22–29)
EGFRCR SERPLBLD CKD-EPI 2021: >90 ML/MIN/1.73M2
FASTING STATUS PATIENT QL REPORTED: NO
GLUCOSE SERPL-MCNC: 353 MG/DL (ref 70–99)
HDLC SERPL-MCNC: 32 MG/DL
LDLC SERPL CALC-MCNC: 84 MG/DL
MICROALBUMIN UR-MCNC: 245 MG/L
MICROALBUMIN/CREAT UR: 175 MG/G CR (ref 0–17)
NONHDLC SERPL-MCNC: 123 MG/DL
POTASSIUM SERPL-SCNC: 3.8 MMOL/L (ref 3.4–5.3)
PROT SERPL-MCNC: 7.1 G/DL (ref 6.4–8.3)
SODIUM SERPL-SCNC: 138 MMOL/L (ref 135–145)
TRIGL SERPL-MCNC: 193 MG/DL

## 2024-02-11 DIAGNOSIS — E11.59 TYPE 2 DIABETES MELLITUS WITH OTHER CIRCULATORY COMPLICATION, WITHOUT LONG-TERM CURRENT USE OF INSULIN (H): ICD-10-CM

## 2024-02-11 DIAGNOSIS — I10 UNCONTROLLED HYPERTENSION: ICD-10-CM

## 2024-02-13 ENCOUNTER — TELEPHONE (OUTPATIENT)
Dept: FAMILY MEDICINE | Facility: CLINIC | Age: 54
End: 2024-02-13

## 2024-02-13 DIAGNOSIS — E11.59 TYPE 2 DIABETES MELLITUS WITH OTHER CIRCULATORY COMPLICATION, WITHOUT LONG-TERM CURRENT USE OF INSULIN (H): Primary | ICD-10-CM

## 2024-02-13 DIAGNOSIS — E11.59 TYPE 2 DIABETES MELLITUS WITH OTHER CIRCULATORY COMPLICATION, WITHOUT LONG-TERM CURRENT USE OF INSULIN (H): ICD-10-CM

## 2024-02-13 RX ORDER — CLOPIDOGREL BISULFATE 75 MG/1
TABLET ORAL
Qty: 90 TABLET | Refills: 1 | Status: SHIPPED | OUTPATIENT
Start: 2024-02-13 | End: 2024-08-28

## 2024-02-13 NOTE — TELEPHONE ENCOUNTER
" Ravi Prajapati MD  2/13/2024  2:52 PM CST Back to Top      **Please call patient with results and recommendations     Nikolai     Your A1c is pretty high at 10.5%.  If you are currently taking 20 units of insulin at bedtime, I recommend increasing that to 32 units tonight.  Increase your insulin dose by 2 units every 2 days until your fasting blood sugar is around 130 on a consistent basis.  I recommend follow-up with me in clinic within the next 1 month.  Also recommend touching base with diabetic education and our MTM team.  I'll order referrals for you.     Ravi Prajapati MD       Pt notified of Provider's message as written. Pt was given numbers for diabetic education and MTM. Pt states he is currently taking 22 units of Basaglar insulin at bedtime. Pt asked for a call back with updated directions. Clarified insulin dosing with Dr. Prajapati and he advised \"If he is currently taking 22 U, increase to 28 units tonight, then increase by 2U every 2 days\"    Left message on answering machine for patient to call back to the nurse at [phone].    When pt returns call, please inform pt: \"If he is currently taking 22 U, increase to 28 units tonight, then increase by 2U every 2 days\"    Freya Bills RN  [facility]  "

## 2024-02-15 RX ORDER — PEN NEEDLE, DIABETIC 32GX 5/32"
NEEDLE, DISPOSABLE MISCELLANEOUS DAILY
Qty: 100 EACH | Refills: 3 | Status: SHIPPED | OUTPATIENT
Start: 2024-02-15

## 2024-02-15 NOTE — TELEPHONE ENCOUNTER
Patient is needing a refill for insulin glargine. Pended script with updated new instructions. Please add a maximum daily dose to the script and send.     Called patient and relayed message from Dr. Prajapati. Pt verbalized understanding.     Mary Bernal RN

## 2024-02-16 ENCOUNTER — TELEPHONE (OUTPATIENT)
Dept: FAMILY MEDICINE | Facility: CLINIC | Age: 54
End: 2024-02-16

## 2024-02-16 NOTE — TELEPHONE ENCOUNTER
MTM referral from: [facility] visit (referral by provider)    MTM referral outreach attempt #2 on February 16, 2024 at 11:59 AM      Outcome: Patient not reachable after several attempts, will route to [facility] Pharmacist/Provider as an FYI.  [facility] scheduling number is .  Thank you for the referral.    Use Private pay for the carrier/Plan on the flowsheet      MT Practitioner please send patient letter    See Adrien  [facility]   [phone]

## 2024-02-22 RX ORDER — INSULIN GLARGINE 100 [IU]/ML
INJECTION, SOLUTION SUBCUTANEOUS
Qty: 15 ML | Refills: 2 | Status: SHIPPED | OUTPATIENT
Start: 2024-02-22 | End: 2024-04-05

## 2024-03-05 ENCOUNTER — ALLIED HEALTH/NURSE VISIT (OUTPATIENT)
Dept: EDUCATION SERVICES | Facility: CLINIC | Age: 54
End: 2024-03-05
Attending: FAMILY MEDICINE

## 2024-03-05 DIAGNOSIS — E11.59 TYPE 2 DIABETES MELLITUS WITH OTHER CIRCULATORY COMPLICATION, WITHOUT LONG-TERM CURRENT USE OF INSULIN (H): ICD-10-CM

## 2024-03-05 PROCEDURE — G0108 DIAB MANAGE TRN  PER INDIV: HCPCS | Performed by: DIETITIAN, REGISTERED

## 2024-03-05 NOTE — PATIENT INSTRUCTIONS
Replace your Kristie 2 sensor.  Contact the  for sensor replacement  Try to scan your sensor: when you wake up, before meals, and right before bed.  You can scan as much as you would like.    Support:   If you have any trouble with use or if the sensor falls off early, call the customer service number for Kristie located on the sensor's box. They can often help troubleshoot or replace sensor if needed.     Kristie Customer Service: [phone]   https://www.[employer]/us-en/support/sensor-support-form-questions.html. Keep your Kristie sensor box with lot and/or serial numbers.    2.  Diabetes Medications   -continue Lantus as 40 units once a day in the morning   -continue metformin as 1 tablet (1000 mg) twice a day    3.  Keep something with you for treating a low blood sugar (70 mg/dL or below).  If your blood sugar is low, eat/drink ONE of the following:   -1/2 cup juice  -1/2 cup regular soda  -1 package fruit snacks  -4 glucose tablets  Then, wait 15 minutes and re-check blood sugar.  If it is not above 70 mg/dL, repeat the above.    4.  As much as you can, work to eliminate regular Coke.   Replace with Diet or Zero soda or water   Okay to go down incrementally (4 cans per day for a week or so, then 3 cans per day for another week, etc)    5.  Follow up with Jyoti on  at 2 PM    6. Please call or send a [employer] message with any questions or concerns or low blood sugar    Jyoti Allen, MPH, RD, MARISOL, LD  Diabetes Education Triage Line: [phone]  Diabetes Education Appointment [phone]

## 2024-03-05 NOTE — LETTER
"    3/5/2024         RE: Nikolai Kincaid  [address]  [place] [address]        Dear Colleague,    Thank you for referring your patient, Nikolai Kincaid, to the [facility]. Please see a copy of my visit note below.    Diabetes Self-Management Education & Support    Presents for: Individual review    Type of Service: In Person Visit      ASSESSMENT:  Patient here without any specific questions/concerns.  Using the Freestyle Kristie 2 with the  as the display device.  Did not bring  to clinic so no glucose data available for assessment.  Therefore no changes to diabetes medications can be recommended.  Recent sensor came off before 14 days.  Discussed with patient the process for having sensors that do not last 14 days replaced by the  and provided patient  contact information.  Pharmacy liaison unable to determine insurance coverage for other diabetes medications (SGLT-2, GLP-1, Mounjaro) as no prescription insurance on file.  Patient confirms he is paying out of pocket for diabetes medications at this time as he is awaiting determination on disability benefits.  Recommended referral to Care Management for assistance/support and patient declined.  He did accept the application for the [place] [facility] Insulin Safety Net Program.  Discussed working to replace the regular soda he consumes with \"diet\" or \"zero\" beverages or water and he is in agreement with this plan.  Discussed guidelines for safe alcohol intake.     Patient's most recent   Lab Results   Component Value Date    A1C 10.5 02/08/2024     is not meeting goal of <7.0    Diabetes knowledge and skills assessment:   Patient is knowledgeable in diabetes management concepts related to: Taking Medication    Continue education with the following diabetes management concepts: Healthy Eating, Being Active, Monitoring, Taking Medication, and Reducing Risks    Based on learning assessment above, most appropriate " setting for further diabetes education would be: Individual setting.      PLAN  Replace your Kristie 2 sensor.  Contact the  for sensor replacement  Try to scan your sensor: when you wake up, before meals, and right before bed.  You can scan as much as you would like.    Support:   If you have any trouble with use or if the sensor falls off early, call the customer service number for Kristie located on the sensor's box. They can often help troubleshoot or replace sensor if needed.     Kristie Customer Service: [phone]   https://www.[employer]/us-en/support/sensor-support-form-questions.html. Keep your Kristie sensor box with lot and/or serial numbers.    2.  Diabetes Medications   -continue Lantus as 40 units once a day in the morning   -continue metformin as 1 tablet (1000 mg) twice a day    3.  Keep something with you for treating a low blood sugar (70 mg/dL or below).  If your blood sugar is low, eat/drink ONE of the following:   -1/2 cup juice  -1/2 cup regular soda  -1 package fruit snacks  -4 glucose tablets  Then, wait 15 minutes and re-check blood sugar.  If it is not above 70 mg/dL, repeat the above.    4.  As much as you can, work to eliminate regular Coke.   Replace with Diet or Zero soda or water   Okay to go down incrementally (4 cans per day for a week or so, then 3 cans per day for another week, etc)    5. Please call or send a [employer] message with any questions or concerns or low blood sugar    Topics to cover at upcoming visits: Healthy Eating, Being Active, Monitoring, Taking Medication, and Reducing Risks    Follow-up: 4/3/24    See Care Plan for co-developed, patient-state behavior change goals.  AVS provided for patient today.    Education Materials Provided:  [place] [facility] Insulin Safety Net Program Application    SUBJECTIVE/OBJECTIVE:  Presents for: Individual review  Accompanied by: Self  Diabetes education in the past 24mo: No  Focus of Visit: Patient Unsure  Diabetes type:  "Type 2  How confident are you filling out medical forms by yourself:: Not Assessed  Transportation concerns: No  Difficulty affording diabetes medication?: Yes  Difficulty affording diabetes testing supplies?: Yes  Other concerns:: Other (stroke 1 year ago)  Cultural Influences/Ethnic Background:  Choose not to answer    Diabetes Symptoms & Complications:  Diabetes Related Symptoms: Polyuria (increased urination)  Weight trend: Stable  Complications assessed today?: No    Patient Problem List and Family Medical History reviewed for relevant medical history, current medical status, and diabetes risk factors.    Vitals:  There were no vitals taken for this visit.  Estimated body mass index is 34.11 kg/m  as calculated from the following:    Height as of 12/20/22: 1.753 m (5' 9\").    Weight as of 2/8/24: 104.8 kg (231 lb).   Last 3 BP:   BP Readings from Last 3 Encounters:   02/08/24 (!) 147/87   01/06/23 138/86   12/20/22 (!) 154/96       History   Smoking Status     Never   Smokeless Tobacco     Never       Labs:  Lab Results   Component Value Date    A1C 10.5 02/08/2024     Lab Results   Component Value Date     02/08/2024     12/20/2022     Lab Results   Component Value Date    LDL 84 02/08/2024     Direct Measure HDL   Date Value Ref Range Status   02/08/2024 32 (L) >=40 mg/dL Final   ]  GFR Estimate   Date Value Ref Range Status   02/08/2024 >90 >60 mL/min/1.73m2 Final     No results found for: \"GFRESTBLACK\"  Lab Results   Component Value Date    CR 0.80 02/08/2024     No results found for: \"MICROALBUMIN\"    Healthy Eating:  Healthy Eating Assessed Today: Yes  Cultural/Anglican diet restrictions?: No  Do you have any food allergies or intolerances?: No  Who cooks/prepares meals for you?: Spouse  Who purchases food in  your home?: Spouse  Breakfast: sometimes apple or orange  Lunch: sometimes; yesterday: goulash, regular Coke  Dinner: sometimes; yesterday: goulash, regular Coke  Snacks: no AM snack; " sometimes PM snack: popcorn;  Other: up for day 6:30 AM, to bed 11 PM  Beverages: Soda, Alcohol, Milk, Water (5 regular soda/day, beer: 6 on Saturday)  Has patient met with a dietitian in the past?: Yes    Being Active:  Being Active Assessed Today: Yes  Exercise:: Yes  Days per week of moderate to strenuous exercise (like a brisk walk): 2  On average, minutes per day of exercise at this level:  (two blocks at a time)    Monitoring:  Monitoring Assessed Today: Yes  Did patient bring glucose meter to appointment? : No  Blood Glucose Meter: CGM      Taking Medications:  Diabetes Medication(s)       Biguanides       metFORMIN (GLUCOPHAGE) 1000 MG tablet Take 1 tablet (1,000 mg) by mouth 2 times daily (with meals)       Insulin       insulin glargine 100 UNIT/ML pen Inject 32 units at bedtime. Increase by 2 units every 2 days until fasting blood sugar is consistently 130.  Max dose of 70 units Reports taking 40 units/day            Taking Medication Assessed Today: Yes  Current Treatments: Diet, Insulin Injections, Oral Medication (taken by mouth)  Dose schedule: Pre-breakfast  Given by: Patient  Injection/Infusion sites: Abdomen  Problems taking diabetes medications regularly?: Yes  Diabetes medication side effects?: No    Problem Solving:  Problem Solving Assessed Today: Yes  Is the patient at risk for hypoglycemia?: Yes  Hypoglycemia Frequency: Never    Hypoglycemia Symptoms  Hypoglycemia: None    Hypoglycemia Complications  Hypoglycemia Complications: None    Reducing Risks:  Reducing Risks Assessed Today: Yes  Diabetes Risks: Age over 45 years, Sedentary Lifestyle, Family History, Hyperlipidemia  CAD Risks: Male sex, Sedentary lifestyle, Diabetes Mellitus, Dyslipidemia  Has dilated eye exam at least once a year?: No  Sees dentist every 6 months?: No  Feet checked by healthcare provider in the last year?: No    Healthy Coping:  Healthy Coping Assessed Today: Yes  Emotional response to diabetes: Ready to  learn  Informal Support system:: Spouse  Stage of change: PRECONTEMPLATION (Not seeing need for change)  Patient Activation Measure Survey Score:       No data to display                  Care Plan and Education Provided:  Care Plan: Diabetes   Updates made by Odilia Allen RD since 3/7/2024 12:00 AM        Problem: HbA1C Not In Goal         Goal: Establish Regular Follow-Ups with PCP         Task: Discuss with PCP the recommended timing for patient's next follow up visit(s)    Responsible User: Odliia Allen RD        Task: Discuss schedule for PCP visits with patient    Responsible User: Odilia Allen RD        Goal: Get HbA1C Level in Goal         Task: Educate patient on diabetes education self-management topics    Responsible User: Odilia Allen RD        Task: Educate patient on benefits of regular glucose monitoring    Responsible User: Odilia Allen RD        Task: Refer patient to appropriate extended care team member, as needed (Medication Therapy Management, Behavioral Health, Physical Therapy, etc.)    Responsible User: Odilia Allen RD        Task: Discuss diabetes treatment plan with patient    Responsible User: Odilia Allen RD        Problem: Diabetes Self-Management Education Needed to Optimize Self-Care Behaviors         Goal: Understand diabetes pathophysiology and disease progression         Task: Provide education on diabetes pathophysiology and disease progression specfic to patient's diabetes type    Responsible User: Odilia Allen RD        Goal: Healthy Eating - follow a healthy eating pattern for diabetes    This Visit's Progress: 0%   Note:    Work to eliminate consumption of regular soda.        Task: Provide education on portion control and consistency in amount, composition and timing of food intake    Responsible User: Odilia Allen RD        Task: Provide education on managing carbohydrate intake (carbohydrate counting,  plate planning method, etc.)    Responsible User: Odilia Allen RD        Task: Provide education on weight management    Responsible User: Odilia Allen RD        Task: Provide education on heart healthy eating    Responsible User: Odilia Allen RD        Task: Provide education on eating out    Responsible User: Odilia Allen RD        Task: Develop individualized healthy eating plan with patient    Responsible User: Odilia Allen RD        Goal: Being Active - get regular physical activity, working up to at least 150 minutes per week         Task: Provide education on relationship of activity to glucose and precautions to take if at risk for low glucose    Responsible User: Odilia Allen RD        Task: Discuss barriers to physical activity with patient    Responsible User: Odilia Allen RD        Task: Develop physical activity plan with patient    Responsible User: Odilia Allen RD        Task: Explore community resources including walking groups, assistance programs, and home videos    Responsible User: Odilia Allen RD        Goal: Monitoring - monitor glucose and ketones as directed         Task: Provide education on blood glucose monitoring (purpose, proper technique, frequency, glucose targets, interpreting results, when to use glucose control solution, sharps disposal)    Responsible User: Odilia Allen RD        Task: Provide education on continuous glucose monitoring (sensor placement, use of yonatan or /reader, understanding glucose trends, alerts and alarms, differences between sensor glucose and blood glucose)    Responsible User: Odilia Allen RD        Task: Provide education on ketone monitoring (when to monitor, frequency, etc.)    Responsible User: Odilia Allen RD        Goal: Taking Medication - patient is consistently taking medications as directed         Task: Provide education on action of prescribed  medication, including when to take and possible side effects    Responsible User: Odilia Allen RD        Task: Provide education on insulin and injectable diabetes medications, including administration, storage, site selection and rotation for injection sites    Responsible User: Odilia Allen RD        Task: Discuss barriers to medication adherence with patient and provide management technique ideas as appropriate    Responsible User: Odilia Allen RD        Task: Provide education on frequency and refill details of medications    Responsible User: Odilia Allen RD        Goal: Problem Solving - know how to prevent and manage short-term diabetes complications         Task: Provide education on high blood glucose - causes, signs/symptoms, prevention and treatment    Responsible User: Odilia Allen RD        Task: Provide education on low blood glucose - causes, signs/symptoms, prevention, treatment, carrying a carbohydrate source at all times, and medical identification Completed 3/7/2024   Responsible User: Odilia Allen RD        Task: Provide education on safe travel with diabetes    Responsible User: Odilia Allen RD        Task: Provide education on how to care for diabetes on sick days    Responsible User: Odilia Allen RD        Task: Provide education on when to call a health care provider    Responsible User: Odilia Allen RD        Goal: Reducing Risks - know how to prevent and treat long-term diabetes complications         Task: Provide education on major complications of diabetes, prevention, early diagnostic measures and treatment of complications    Responsible User: Odilia Allen RD        Task: Provide education on recommended care for dental, eye and foot health    Responsible User: Odilia Allen RD        Task: Provide education on Hemoglobin A1c - goals and relationship to blood glucose levels    Responsible User:  Odilia Allen RD        Task: Provide education on recommendations for heart health - lipid levels and goals, blood pressure and goals, and aspirin therapy, if indicated    Responsible User: Odilia Allen RD        Task: Provide education on tobacco cessation    Responsible User: Odilia Allen RD        Goal: Healthy Coping - use available resources to cope with the challenges of managing diabetes         Task: Discuss recognizing feelings about having diabetes    Responsible User: Odilia Allen RD        Task: Provide education on the benefits of making appropriate lifestyle changes    Responsible User: Odilia Allen RD        Task: Provide education on benefits of utilizing support systems    Responsible User: Odilia Allen RD        Task: Discuss methods for coping with stress    Responsible User: Odilia Allen RD        Task: Provide education on when to seek professional counseling    Responsible User: Odilia Allen RD Beth Reisdorf, MPH, RD, CDCES, LD 3/7/2024      Time Spent: 35 minutes  Encounter Type: Individual    Any diabetes medication dose changes were made via the CDE Protocol per the patient's referring provider. A copy of this encounter was shared with the provider.

## 2024-03-07 NOTE — PROGRESS NOTES
"Diabetes Self-Management Education & Support    Presents for: Individual review    Type of Service: In Person Visit      ASSESSMENT:  Patient here without any specific questions/concerns.  Using the Freestyle Kristie 2 with the  as the display device.  Did not bring  to clinic so no glucose data available for assessment.  Therefore no changes to diabetes medications can be recommended.  Recent sensor came off before 14 days.  Discussed with patient the process for having sensors that do not last 14 days replaced by the  and provided patient  contact information.  Pharmacy liaison unable to determine insurance coverage for other diabetes medications (SGLT-2, GLP-1, Mounjaro) as no prescription insurance on file.  Patient confirms he is paying out of pocket for diabetes medications at this time as he is awaiting determination on disability benefits.  Recommended referral to Care Management for assistance/support and patient declined.  He did accept the application for the [place] [facility] Insulin Safety Net Program.  Discussed working to replace the regular soda he consumes with \"diet\" or \"zero\" beverages or water and he is in agreement with this plan.  Discussed guidelines for safe alcohol intake.     Patient's most recent   Lab Results   Component Value Date    A1C 10.5 02/08/2024     is not meeting goal of <7.0    Diabetes knowledge and skills assessment:   Patient is knowledgeable in diabetes management concepts related to: Taking Medication    Continue education with the following diabetes management concepts: Healthy Eating, Being Active, Monitoring, Taking Medication, and Reducing Risks    Based on learning assessment above, most appropriate setting for further diabetes education would be: Individual setting.      PLAN  Replace your Kristie 2 sensor.  Contact the  for sensor replacement  Try to scan your sensor: when you wake up, before meals, and right before bed.  " You can scan as much as you would like.    Support:   If you have any trouble with use or if the sensor falls off early, call the customer service number for Kristie located on the sensor's box. They can often help troubleshoot or replace sensor if needed.     Kristie Customer Service: [phone]   [email or web address]/[email or web address]/sensor-[email or web address]. Keep your Kristie sensor box with lot and/or serial numbers.    2.  Diabetes Medications   -continue Lantus as 40 units once a day in the morning   -continue metformin as 1 tablet (1000 mg) twice a day    3.  Keep something with you for treating a low blood sugar (70 mg/dL or below).  If your blood sugar is low, eat/drink ONE of the following:   -1/2 cup juice  -1/2 cup regular soda  -1 package fruit snacks  -4 glucose tablets  Then, wait 15 minutes and re-check blood sugar.  If it is not above 70 mg/dL, repeat the above.    4.  As much as you can, work to eliminate regular Coke.   Replace with Diet or Zero soda or water   Okay to go down incrementally (4 cans per day for a week or so, then 3 cans per day for another week, etc)    5. Please call or send a [employer] message with any questions or concerns or low blood sugar    Topics to cover at upcoming visits: Healthy Eating, Being Active, Monitoring, Taking Medication, and Reducing Risks    Follow-up: 4/3/24    See Care Plan for co-developed, patient-state behavior change goals.  AVS provided for patient today.    Education Materials Provided:  [place] [facility] Safety [facility] Program Application    SUBJECTIVE/OBJECTIVE:  Presents for: Individual review  Accompanied by: Self  Diabetes education in the past 24mo: No  Focus of Visit: Patient Unsure  Diabetes type: Type 2  How confident are you filling out medical forms by yourself:: Not Assessed  Transportation concerns: No  Difficulty affording diabetes medication?: Yes  Difficulty affording diabetes testing supplies?: Yes  Other concerns:: Other  "(stroke 1 year ago)  Cultural Influences/Ethnic Background:  Choose not to answer    Diabetes Symptoms & Complications:  Diabetes Related Symptoms: Polyuria (increased urination)  Weight trend: Stable  Complications assessed today?: No    Patient Problem List and Family Medical History reviewed for relevant medical history, current medical status, and diabetes risk factors.    Vitals:  There were no vitals taken for this visit.  Estimated body mass index is 34.11 kg/m  as calculated from the following:    Height as of 12/20/22: 1.753 m (5' 9\").    Weight as of 2/8/24: 104.8 kg (231 lb).   Last 3 BP:   BP Readings from Last 3 Encounters:   02/08/24 (!) 147/87   01/06/23 138/86   12/20/22 (!) 154/96       History   Smoking Status    Never   Smokeless Tobacco    Never       Labs:  Lab Results   Component Value Date    A1C 10.5 02/08/2024     Lab Results   Component Value Date     02/08/2024     12/20/2022     Lab Results   Component Value Date    LDL 84 02/08/2024     Direct Measure HDL   Date Value Ref Range Status   02/08/2024 32 (L) >=40 mg/dL Final   ]  GFR Estimate   Date Value Ref Range Status   02/08/2024 >90 >60 mL/min/1.73m2 Final     No results found for: \"GFRESTBLACK\"  Lab Results   Component Value Date    CR 0.80 02/08/2024     No results found for: \"MICROALBUMIN\"    Healthy Eating:  Healthy Eating Assessed Today: Yes  Cultural/Holiness diet restrictions?: No  Do you have any food allergies or intolerances?: No  Who cooks/prepares meals for you?: Spouse  Who purchases food in  your home?: Spouse  Breakfast: sometimes apple or orange  Lunch: sometimes; yesterday: goulash, regular Coke  Dinner: sometimes; yesterday: goulash, regular Coke  Snacks: no AM snack; sometimes PM snack: popcorn;  Other: up for day 6:30 AM, to bed 11 PM  Beverages: Soda, Alcohol, Milk, Water (5 regular soda/day, beer: 6 on Saturday)  Has patient met with a dietitian in the past?: Yes    Being Active:  Being Active " Assessed Today: Yes  Exercise:: Yes  Days per week of moderate to strenuous exercise (like a brisk walk): 2  On average, minutes per day of exercise at this level:  (two blocks at a time)    Monitoring:  Monitoring Assessed Today: Yes  Did patient bring glucose meter to appointment? : No  Blood Glucose Meter: CGM      Taking Medications:  Diabetes Medication(s)       Biguanides       metFORMIN (GLUCOPHAGE) 1000 MG tablet Take 1 tablet (1,000 mg) by mouth 2 times daily (with meals)       Insulin       insulin glargine 100 UNIT/ML pen Inject 32 units at bedtime. Increase by 2 units every 2 days until fasting blood sugar is consistently 130.  Max dose of 70 units Reports taking 40 units/day            Taking Medication Assessed Today: Yes  Current Treatments: Diet, Insulin Injections, Oral Medication (taken by mouth)  Dose schedule: Pre-breakfast  Given by: Patient  Injection/Infusion sites: Abdomen  Problems taking diabetes medications regularly?: Yes  Diabetes medication side effects?: No    Problem Solving:  Problem Solving Assessed Today: Yes  Is the patient at risk for hypoglycemia?: Yes  Hypoglycemia Frequency: Never    Hypoglycemia Symptoms  Hypoglycemia: None    Hypoglycemia Complications  Hypoglycemia Complications: None    Reducing Risks:  Reducing Risks Assessed Today: Yes  Diabetes Risks: Age over 45 years, Sedentary Lifestyle, Family History, Hyperlipidemia  CAD Risks: Male sex, Sedentary lifestyle, Diabetes Mellitus, Dyslipidemia  Has dilated eye exam at least once a year?: No  Sees dentist every 6 months?: No  Feet checked by healthcare provider in the last year?: No    Healthy Coping:  Healthy Coping Assessed Today: Yes  Emotional response to diabetes: Ready to learn  Informal Support system:: Spouse  Stage of change: PRECONTEMPLATION (Not seeing need for change)  Patient Activation Measure Survey Score:       No data to display                  Care Plan and Education Provided:  Care Plan: Diabetes    Updates made by Odilia Allen RD since 3/7/2024 12:00 AM        Problem: HbA1C Not In Goal         Goal: Establish Regular Follow-Ups with PCP         Task: Discuss with PCP the recommended timing for patient's next follow up visit(s)    Responsible User: Odilia Allen RD        Task: Discuss schedule for PCP visits with patient    Responsible User: Odilia Allen RD        Goal: Get HbA1C Level in Goal         Task: Educate patient on diabetes education self-management topics    Responsible User: Odilia Allen RD        Task: Educate patient on benefits of regular glucose monitoring    Responsible User: Odilia Allen RD        Task: Refer patient to appropriate extended care team member, as needed (Medication Therapy Management, Behavioral Health, Physical Therapy, etc.)    Responsible User: Odilia Allen RD        Task: Discuss diabetes treatment plan with patient    Responsible User: Odilia Allen RD        Problem: Diabetes Self-Management Education Needed to Optimize Self-Care Behaviors         Goal: Understand diabetes pathophysiology and disease progression         Task: Provide education on diabetes pathophysiology and disease progression specfic to patient's diabetes type    Responsible User: Odilia Allen RD        Goal: Healthy Eating - follow a healthy eating pattern for diabetes    This Visit's Progress: 0%   Note:    Work to eliminate consumption of regular soda.        Task: Provide education on portion control and consistency in amount, composition and timing of food intake    Responsible User: Odilia Allen RD        Task: Provide education on managing carbohydrate intake (carbohydrate counting, plate planning method, etc.)    Responsible User: Odilia Allen RD        Task: Provide education on weight management    Responsible User: Odilia Allen RD        Task: Provide education on heart healthy eating    Responsible  User: Odilia Allen RD        Task: Provide education on eating out    Responsible User: Odilia Allen RD        Task: Develop individualized healthy eating plan with patient    Responsible User: Odilia Allen RD        Goal: Being Active - get regular physical activity, working up to at least 150 minutes per week         Task: Provide education on relationship of activity to glucose and precautions to take if at risk for low glucose    Responsible User: Odilia Allen RD        Task: Discuss barriers to physical activity with patient    Responsible User: Odilia Allen RD        Task: Develop physical activity plan with patient    Responsible User: Odilia Allen RD        Task: Explore community resources including walking groups, assistance programs, and home videos    Responsible User: Odilia Allen RD        Goal: Monitoring - monitor glucose and ketones as directed         Task: Provide education on blood glucose monitoring (purpose, proper technique, frequency, glucose targets, interpreting results, when to use glucose control solution, sharps disposal)    Responsible User: Odilia Allen RD        Task: Provide education on continuous glucose monitoring (sensor placement, use of yonatan or /reader, understanding glucose trends, alerts and alarms, differences between sensor glucose and blood glucose)    Responsible User: Odilia Allen RD        Task: Provide education on ketone monitoring (when to monitor, frequency, etc.)    Responsible User: Odilia Allen RD        Goal: Taking Medication - patient is consistently taking medications as directed         Task: Provide education on action of prescribed medication, including when to take and possible side effects    Responsible User: Odilia Allen RD        Task: Provide education on insulin and injectable diabetes medications, including administration, storage, site selection and  rotation for injection sites    Responsible User: Odilia Allen RD        Task: Discuss barriers to medication adherence with patient and provide management technique ideas as appropriate    Responsible User: Odilia Allen RD        Task: Provide education on frequency and refill details of medications    Responsible User: Odilia Allen RD        Goal: Problem Solving - know how to prevent and manage short-term diabetes complications         Task: Provide education on high blood glucose - causes, signs/symptoms, prevention and treatment    Responsible User: Odilia Allen RD        Task: Provide education on low blood glucose - causes, signs/symptoms, prevention, treatment, carrying a carbohydrate source at all times, and medical identification Completed 3/7/2024   Responsible User: Odilia Allen RD        Task: Provide education on safe travel with diabetes    Responsible User: Odilia Allen RD        Task: Provide education on how to care for diabetes on sick days    Responsible User: Odilia Allen RD        Task: Provide education on when to call a health care provider    Responsible User: Odilia Allen RD        Goal: Reducing Risks - know how to prevent and treat long-term diabetes complications         Task: Provide education on major complications of diabetes, prevention, early diagnostic measures and treatment of complications    Responsible User: Odilia Allen RD        Task: Provide education on recommended care for dental, eye and foot health    Responsible User: Odilia Allen RD        Task: Provide education on Hemoglobin A1c - goals and relationship to blood glucose levels    Responsible User: Odilia Allen RD        Task: Provide education on recommendations for heart health - lipid levels and goals, blood pressure and goals, and aspirin therapy, if indicated    Responsible User: Odilia Allen RD        Task: Provide  education on tobacco cessation    Responsible User: Odilia Allen RD        Goal: Healthy Coping - use available resources to cope with the challenges of managing diabetes         Task: Discuss recognizing feelings about having diabetes    Responsible User: Odilia Allen RD        Task: Provide education on the benefits of making appropriate lifestyle changes    Responsible User: Odilia Allen RD        Task: Provide education on benefits of utilizing support systems    Responsible User: Odilia Allen RD        Task: Discuss methods for coping with stress    Responsible User: Odilia Allen RD        Task: Provide education on when to seek professional counseling    Responsible User: Odilia Allen RD Beth Reisdorf, MPH, RD, CDCES, LD 3/7/2024      Time Spent: 35 minutes  Encounter Type: Individual    Any diabetes medication dose changes were made via the CDE Protocol per the patient's referring provider. A copy of this encounter was shared with the provider.

## 2024-04-05 ENCOUNTER — ALLIED HEALTH/NURSE VISIT (OUTPATIENT)
Dept: EDUCATION SERVICES | Facility: CLINIC | Age: 54
End: 2024-04-05

## 2024-04-05 VITALS — WEIGHT: 233 LBS | BODY MASS INDEX: 34.41 KG/M2

## 2024-04-05 DIAGNOSIS — E11.9 TYPE 2 DIABETES MELLITUS WITHOUT COMPLICATION, WITHOUT LONG-TERM CURRENT USE OF INSULIN (H): ICD-10-CM

## 2024-04-05 DIAGNOSIS — E11.59 TYPE 2 DIABETES MELLITUS WITH OTHER CIRCULATORY COMPLICATION, WITHOUT LONG-TERM CURRENT USE OF INSULIN (H): Primary | ICD-10-CM

## 2024-04-05 PROCEDURE — G0108 DIAB MANAGE TRN  PER INDIV: HCPCS | Performed by: DIETITIAN, REGISTERED

## 2024-04-05 RX ORDER — INSULIN GLARGINE 100 [IU]/ML
INJECTION, SOLUTION SUBCUTANEOUS
Qty: 45 ML | Refills: 0 | Status: SHIPPED | OUTPATIENT
Start: 2024-04-05 | End: 2024-09-06

## 2024-04-05 NOTE — PROGRESS NOTES
Diabetes Self-Management Education & Support    Presents for: CGM Review    Type of Service: In Person Visit      REPORTS:      Pt verbalized understanding of concepts discussed and recommendations provided today.       Continue education with the following diabetes management concepts: Healthy Eating, Being Active, Monitoring, Taking Medication, and Reducing Risks    ASSESSMENT:  Most recent CGM data as above which shows patient IS meeting glucose goals for:    -time below 70 mg/dL of less than 4%   -time below 54 mg/dL of less than 1%  Patient IS NOT meeting glucose goals for:    -time in  mg/dL target of above 70%   -time above 180 mg/dL of less than 25%   -time above 250 mg/dL of less than 5%  Instructed patient to scan sensor more often to capture more complete glucose data. Not yet ready to start rapid acting insulin at mealtime OR a sulfonylurea because he does not have medical insurance at this time and does not want to burden his spouse with the expense. Reports he has had one hearing for disability benefits and has another one scheduled. Declined to contact  [employer] Prescription assistance program to see if he qualifies for  assistance programs.     Reviewed tools for managing diabetes (meal planning, activity, stress management, medications, and monitoring).  Discussed most people use a combination of these tools for diabetes management.  Affirmed patient for stopping regular soda!  Reports he replaced with Zero soda. Introduced carbohydrate counting and label reading.  Used measuring cup and food model to discuss portion sizes.  Encouraged addition of physical activity into his routine.  Reports he likes to walk and patient set a goal of going for a walk twice a week.       PLAN  Great job stopping regular soda!    Freestyle Kristie 2  Try to scan your sensor: when you wake up, before meals, and right before bed.  You can scan as much as you would like.     Support:   If you have  any trouble with use or if the sensor falls off early, call the customer service number for Kristie located on the sensor's box. They can often help troubleshoot or replace sensor if needed.      Kristie Customer Service: [phone]   [email or web address]/us-en/support/sensor-[email or web address]-[email or web address]. Keep your Kristie sensor box with lot and/or serial numbers.     2.  Diabetes Medications              -increase Lantus to 34 units once a day in the morning              -continue metformin as 1 tablet (1000 mg) twice a day     3.  Keep something with you for treating a low blood sugar (70 mg/dL or below).  If your blood sugar is low, eat/drink ONE of the following:   -1/2 cup juice  -1/2 cup regular soda  -1 package fruit snacks  -4 glucose tablets  Then, wait 15 minutes and re-check blood sugar.  If it is not above 70 mg/dL, repeat the above.     4. Meal Planning   Aim for 45-60 grams of carbohydrate per meal   Up to 15 grams of carbohydrate per snack   Use resources to count carbohydrates: food labels, written materials, Calorie [employer] web site or yonatan    5  Please call or send a [email or web address] message with any questions or concerns or low blood sugar    Topics to cover at upcoming visits: Healthy Eating, Being Active, Monitoring, Taking Medication, and Reducing Risks    Follow-up: 5/10/24    See Care Plan for co-developed, patient-state behavior change goals.  AVS provided for patient today.    Education Materials Provided:  [employer] Healthy Living with Diabetes Book      SUBJECTIVE/OBJECTIVE:  Presents for: CGM Review  Accompanied by: Self  Diabetes education in the past 24mo: Yes  Focus of Visit: Taking Medication, Healthy Eating  Diabetes type: Type 2  How confident are you filling out medical forms by yourself:: Not Assessed  Transportation concerns: No  Difficulty affording diabetes medication?: No  Other concerns:: Other (stroke 1 year ago)  Cultural Influences/Ethnic Background:  Choose not to  "answer    Diabetes Symptoms & Complications:  Complications assessed today?: No    Patient Problem List and Family Medical History reviewed for relevant medical history, current medical status, and diabetes risk factors.    Vitals:  Wt 105.7 kg (233 lb)   BMI 34.41 kg/m    Estimated body mass index is 34.41 kg/m  as calculated from the following:    Height as of 12/20/22: 1.753 m (5' 9\").    Weight as of this encounter: 105.7 kg (233 lb).   Last 3 BP:   BP Readings from Last 3 Encounters:   02/08/24 (!) 147/87   01/06/23 138/86   12/20/22 (!) 154/96       History   Smoking Status    Never   Smokeless Tobacco    Never       Labs:  Lab Results   Component Value Date    A1C 10.5 02/08/2024     Lab Results   Component Value Date     02/08/2024     12/20/2022     Lab Results   Component Value Date    LDL 84 02/08/2024     Direct Measure HDL   Date Value Ref Range Status   02/08/2024 32 (L) >=40 mg/dL Final   ]  GFR Estimate   Date Value Ref Range Status   02/08/2024 >90 >60 mL/min/1.73m2 Final     No results found for: \"GFRESTBLACK\"  Lab Results   Component Value Date    CR 0.80 02/08/2024     No results found for: \"MICROALBUMIN\"    Healthy Eating:  Healthy Eating Assessed Today: Yes  Cultural/Worship diet restrictions?: No  Do you have any food allergies or intolerances?: No  Who cooks/prepares meals for you?: Spouse  Who purchases food in  your home?: Spouse  Breakfast: sometimes apple or orange OR 2 toast with peanut butter OR cereal with milk  Beverages: Alcohol, Diet soda (alcohol 2x/week with 2-3 beers/sitting)  Has patient met with a dietitian in the past?: Yes    Being Active:  Being Active Assessed Today: Yes  Exercise:: Yes (inconsistent walking)    Monitoring:  Monitoring Assessed Today: Yes  Did patient bring glucose meter to appointment? : Yes  Blood Glucose Meter: CGM    No CGM data for 3/23-3/25            Taking Medications:  Diabetes Medication(s)       Biguanides       metFORMIN " (GLUCOPHAGE) 1000 MG tablet Take 1 tablet (1,000 mg) by mouth 2 times daily (with meals)       Insulin       insulin glargine 100 UNIT/ML pen Inject 32 units at bedtime. Increase by 2 units every 2 days until fasting blood sugar is consistently 130.  Max dose of 70 units Reports taking 28-31 units/day.             Taking Medication Assessed Today: Yes  Current Treatments: Diet, Insulin Injections, Oral Medication (taken by mouth)  Given by: Patient  Diabetes medication side effects?: No    Problem Solving:  Problem Solving Assessed Today: Yes  Is the patient at risk for hypoglycemia?: Yes  Hypoglycemia Frequency: Never    Reducing Risks:  Reducing Risks Assessed Today: No  Diabetes Risks: Age over 45 years, Sedentary Lifestyle, Family History, Hyperlipidemia  CAD Risks: Male sex, Sedentary lifestyle, Diabetes Mellitus, Dyslipidemia    Healthy Coping:  Healthy Coping Assessed Today: Yes  Emotional response to diabetes: Ready to learn  Informal Support system:: Spouse  Stage of change: PREPARATION (Decided to change - considering how)  Patient Activation Measure Survey Score:       No data to display                  Care Plan and Education Provided:  Care Plan: Diabetes   Updates made by Odilia Allen RD since 4/5/2024 12:00 AM        Problem: Diabetes Self-Management Education Needed to Optimize Self-Care Behaviors         Goal: Healthy Eating - follow a healthy eating pattern for diabetes    This Visit's Progress: 100%   Recent Progress: 0%   Note:    From 3/5/24: Work to eliminate consumption of regular soda.        Goal: Being Active - get regular physical activity, working up to at least 150 minutes per week    This Visit's Progress: 0%   Note:    From 4/5/24: Go for a walk on each Tuesday and Thursday.        Goal: Monitoring - monitor glucose and ketones as directed    This Visit's Progress: 40%   Note:    Use Freestyle Kristie 2 to capture 100% glucose data.         Jyoti Allen, MPH, RD, CDCES, LD  4/5/2024    Time Spent: 60 minutes  Encounter Type: Individual    Any diabetes medication dose changes were made via the CDE Protocol per the patient's referring provider. A copy of this encounter was shared with the provider.

## 2024-04-05 NOTE — LETTER
4/5/2024         RE: Nikolai Kincaid  [address]  [place] [address]        Dear Colleague,    Thank you for referring your patient, Nikolai Kincaid, to the [facility] [place] [facility]\"". Please see a copy of my visit note below.    Diabetes Self-Management Education & Support    Presents for: CGM Review    Type of Service: In Person Visit      REPORTS:      Pt verbalized understanding of concepts discussed and recommendations provided today.       Continue education with the following diabetes management concepts: Healthy Eating, Being Active, Monitoring, Taking Medication, and Reducing Risks    ASSESSMENT:  Most recent CGM data as above which shows patient IS meeting glucose goals for:    -time below 70 mg/dL of less than 4%   -time below 54 mg/dL of less than 1%  Patient IS NOT meeting glucose goals for:    -time in  mg/dL target of above 70%   -time above 180 mg/dL of less than 25%   -time above 250 mg/dL of less than 5%  Instructed patient to scan sensor more often to capture more complete glucose data. Not yet ready to start rapid acting insulin at mealtime OR a sulfonylurea because he does not have medical insurance at this time and does not want to burden his spouse with the expense. Reports he has had one hearing for disability benefits and has another one scheduled. Declined to contact [facility] Prescription assistance program to see if he qualifies for  assistance programs.     Reviewed tools for managing diabetes (meal planning, activity, stress management, medications, and monitoring).  Discussed most people use a combination of these tools for diabetes management.  Affirmed patient for stopping regular soda!  Reports he replaced with Zero soda. Introduced carbohydrate counting and label reading.  Used measuring cup and food model to discuss portion sizes.  Encouraged addition of physical activity into his routine.  Reports he likes to walk and patient set a goal of going  for a walk twice a week.       PLAN  Great job stopping regular soda!    Freestyle Kristie 2  Try to scan your sensor: when you wake up, before meals, and right before bed.  You can scan as much as you would like.     Support:   If you have any trouble with use or if the sensor falls off early, call the customer service number for Kristie located on the sensor's box. They can often help troubleshoot or replace sensor if needed.      Kristie Customer Service: [phone]   https://[email or web address]/us-en/support/sensor-support-form-questions.html. Keep your Kristie sensor box with lot and/or serial numbers.     2.  Diabetes Medications              -increase Lantus to 34 units once a day in the morning              -continue metformin as 1 tablet (1000 mg) twice a day     3.  Keep something with you for treating a low blood sugar (70 mg/dL or below).  If your blood sugar is low, eat/drink ONE of the following:   -1/2 cup juice  -1/2 cup regular soda  -1 package fruit snacks  -4 glucose tablets  Then, wait 15 minutes and re-check blood sugar.  If it is not above 70 mg/dL, repeat the above.     4. Meal Planning   Aim for 45-60 grams of carbohydrate per meal   Up to 15 grams of carbohydrate per snack   Use resources to count carbohydrates: food labels, written materials, Calorie Rodriguez web site or yonatan    5  Please call or send a [employer] message with any questions or concerns or low blood sugar    Topics to cover at upcoming visits: Healthy Eating, Being Active, Monitoring, Taking Medication, and Reducing Risks    Follow-up: 5/10/24    See Care Plan for co-developed, patient-state behavior change goals.  AVS provided for patient today.    Education Materials Provided:  [facility] Healthy Living with Diabetes Book      SUBJECTIVE/OBJECTIVE:  Presents for: CGM Review  Accompanied by: Self  Diabetes education in the past 24mo: Yes  Focus of Visit: Taking Medication, Healthy Eating  Diabetes type: Type 2  How confident are  "you filling out medical forms by yourself:: Not Assessed  Transportation concerns: No  Difficulty affording diabetes medication?: No  Other concerns:: Other (stroke 1 year ago)  Cultural Influences/Ethnic Background:  Choose not to answer    Diabetes Symptoms & Complications:  Complications assessed today?: No    Patient Problem List and Family Medical History reviewed for relevant medical history, current medical status, and diabetes risk factors.    Vitals:  Wt 105.7 kg (233 lb)   BMI 34.41 kg/m    Estimated body mass index is 34.41 kg/m  as calculated from the following:    Height as of 12/20/22: 1.753 m (5' 9\").    Weight as of this encounter: 105.7 kg (233 lb).   Last 3 BP:   BP Readings from Last 3 Encounters:   02/08/24 (!) 147/87   01/06/23 138/86   12/20/22 (!) 154/96       History   Smoking Status     Never   Smokeless Tobacco     Never       Labs:  Lab Results   Component Value Date    A1C 10.5 02/08/2024     Lab Results   Component Value Date     02/08/2024     12/20/2022     Lab Results   Component Value Date    LDL 84 02/08/2024     Direct Measure HDL   Date Value Ref Range Status   02/08/2024 32 (L) >=40 mg/dL Final   ]  GFR Estimate   Date Value Ref Range Status   02/08/2024 >90 >60 mL/min/1.73m2 Final     No results found for: \"GFRESTBLACK\"  Lab Results   Component Value Date    CR 0.80 02/08/2024     No results found for: \"MICROALBUMIN\"    Healthy Eating:  Healthy Eating Assessed Today: Yes  Cultural/Christianity diet restrictions?: No  Do you have any food allergies or intolerances?: No  Who cooks/prepares meals for you?: Spouse  Who purchases food in  your home?: Spouse  Breakfast: sometimes apple or orange OR 2 toast with peanut butter OR cereal with milk  Beverages: Alcohol, Diet soda (alcohol 2x/week with 2-3 beers/sitting)  Has patient met with a dietitian in the past?: Yes    Being Active:  Being Active Assessed Today: Yes  Exercise:: Yes (inconsistent " walking)    Monitoring:  Monitoring Assessed Today: Yes  Did patient bring glucose meter to appointment? : Yes  Blood Glucose Meter: CGM    No CGM data for 3/23-3/25            Taking Medications:  Diabetes Medication(s)       Biguanides       metFORMIN (GLUCOPHAGE) 1000 MG tablet Take 1 tablet (1,000 mg) by mouth 2 times daily (with meals)       Insulin       insulin glargine 100 UNIT/ML pen Inject 32 units at bedtime. Increase by 2 units every 2 days until fasting blood sugar is consistently 130.  Max dose of 70 units Reports taking 28-31 units/day.             Taking Medication Assessed Today: Yes  Current Treatments: Diet, Insulin Injections, Oral Medication (taken by mouth)  Given by: Patient  Diabetes medication side effects?: No    Problem Solving:  Problem Solving Assessed Today: Yes  Is the patient at risk for hypoglycemia?: Yes  Hypoglycemia Frequency: Never    Reducing Risks:  Reducing Risks Assessed Today: No  Diabetes Risks: Age over 45 years, Sedentary Lifestyle, Family History, Hyperlipidemia  CAD Risks: Male sex, Sedentary lifestyle, Diabetes Mellitus, Dyslipidemia    Healthy Coping:  Healthy Coping Assessed Today: Yes  Emotional response to diabetes: Ready to learn  Informal Support system:: Spouse  Stage of change: PREPARATION (Decided to change - considering how)  Patient Activation Measure Survey Score:       No data to display                  Care Plan and Education Provided:  Care Plan: Diabetes   Updates made by Odilia Allen RD since 4/5/2024 12:00 AM        Problem: Diabetes Self-Management Education Needed to Optimize Self-Care Behaviors         Goal: Healthy Eating - follow a healthy eating pattern for diabetes    This Visit's Progress: 100%   Recent Progress: 0%   Note:    From 3/5/24: Work to eliminate consumption of regular soda.        Goal: Being Active - get regular physical activity, working up to at least 150 minutes per week    This Visit's Progress: 0%   Note:    From  4/5/24: Go for a walk on each Tuesday and Thursday.        Goal: Monitoring - monitor glucose and ketones as directed    This Visit's Progress: 40%   Note:    Use Freestyle Kristie 2 to capture 100% glucose data.         Jyoti Allen, MPH, RD, CDCES, LD 4/5/2024    Time Spent: 60 minutes  Encounter Type: Individual    Any diabetes medication dose changes were made via the CDE Protocol per the patient's referring provider. A copy of this encounter was shared with the provider.

## 2024-04-05 NOTE — Clinical Note
Not willing to add rapid acting insulin or sulfonylurea b/c does not want to burden wife with cost as he does not have insurance.  Declined [facility] Prescription Assistance. See my note for details.

## 2024-04-13 ENCOUNTER — TRANSFERRED RECORDS (OUTPATIENT)
Dept: HEALTH INFORMATION MANAGEMENT | Facility: CLINIC | Age: 54
End: 2024-04-13

## 2024-06-06 ENCOUNTER — OFFICE VISIT (OUTPATIENT)
Dept: FAMILY MEDICINE | Facility: CLINIC | Age: 54
End: 2024-06-06

## 2024-06-06 VITALS
OXYGEN SATURATION: 96 % | DIASTOLIC BLOOD PRESSURE: 96 MMHG | HEART RATE: 89 BPM | SYSTOLIC BLOOD PRESSURE: 153 MMHG | RESPIRATION RATE: 16 BRPM | TEMPERATURE: 97.6 F | HEIGHT: 69 IN | BODY MASS INDEX: 35.16 KG/M2 | WEIGHT: 237.4 LBS

## 2024-06-06 DIAGNOSIS — Z12.11 SCREEN FOR COLON CANCER: ICD-10-CM

## 2024-06-06 DIAGNOSIS — R47.02 DYSPHASIA: ICD-10-CM

## 2024-06-06 DIAGNOSIS — E11.59 TYPE 2 DIABETES MELLITUS WITH OTHER CIRCULATORY COMPLICATION, WITHOUT LONG-TERM CURRENT USE OF INSULIN (H): Primary | ICD-10-CM

## 2024-06-06 DIAGNOSIS — Z95.818 STATUS POST PLACEMENT OF IMPLANTABLE LOOP RECORDER: ICD-10-CM

## 2024-06-06 DIAGNOSIS — R41.89 DISORGANIZED THINKING: ICD-10-CM

## 2024-06-06 DIAGNOSIS — I69.311 MEMORY DEFICIT AFTER CEREBRAL INFARCTION: ICD-10-CM

## 2024-06-06 DIAGNOSIS — R47.89 WORD FINDING DIFFICULTY: ICD-10-CM

## 2024-06-06 LAB — HBA1C MFR BLD: 8.9 % (ref 0–5.6)

## 2024-06-06 PROCEDURE — G2211 COMPLEX E/M VISIT ADD ON: HCPCS | Performed by: FAMILY MEDICINE

## 2024-06-06 PROCEDURE — 36415 COLL VENOUS BLD VENIPUNCTURE: CPT | Performed by: FAMILY MEDICINE

## 2024-06-06 PROCEDURE — 99213 OFFICE O/P EST LOW 20 MIN: CPT | Performed by: FAMILY MEDICINE

## 2024-06-06 PROCEDURE — 83036 HEMOGLOBIN GLYCOSYLATED A1C: CPT | Performed by: FAMILY MEDICINE

## 2024-06-06 NOTE — PROGRESS NOTES
"  Assessment & Plan       ICD-10-CM    1. Type 2 diabetes mellitus with other circulatory complication, without long-term current use of insulin (H)  E11.59 Hemoglobin A1c     Hemoglobin A1c     CANCELED: Hemoglobin A1c      2. Screen for colon cancer  Z12.11       3. Dysphasia  R47.02 Occupational Therapy  Referral     Speech Therapy  Referral      4. 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 Word finding difficulty  R47.89 Occupational Therapy  Referral      6. Disorganized thinking  R41.89 Occupational Therapy  Referral      7. Status post placement of implantable loop recorder  Z95.818             The longitudinal plan of care for the diagnosis(es)/condition(s) as documented were addressed during this visit. Due to the added complexity in care, I will continue to support Nikolai in the subsequent management and with ongoing continuity of care.       BMI  Estimated body mass index is 35.06 kg/m  as calculated from the following:    Height as of this encounter: 1.753 m (5' 9\").    Weight as of this encounter: 107.7 kg (237 lb 6.4 oz).   Weight management plan: Discussed healthy diet and exercise guidelines      There are no Patient Instructions on file for this visit.    Subjective   Nikolai is a 54 year old, presenting for the following health issues:  Forms      6/6/2024    10:10 AM   Additional Questions   Roomed by Esther   Accompanied by Spouse         6/6/2024   Forms   Any forms needing to be completed Yes         6/6/2024    10:10 AM   Patient Reported Additional Medications   Patient reports taking the following new medications none     HPI     History of stroke  Aphasia  Filter  Disorganized thinking with speech  Trouble with memory  Word finding                    Review of Systems  Constitutional, HEENT, cardiovascular, pulmonary, gi and gu systems are negative, except as otherwise noted.      Objective    BP (!) " "164/107   Pulse 89   Temp 97.6  F (36.4  C) (Temporal)   Resp 16   Ht 1.753 m (5' 9\")   Wt 107.7 kg (237 lb 6.4 oz)   SpO2 96%   BMI 35.06 kg/m    Body mass index is 35.06 kg/m .  Physical Exam  Constitutional:       General: He is not in acute distress.     Appearance: Normal appearance. He is well-developed. He is not ill-appearing.   HENT:      Head: Normocephalic and atraumatic.      Right Ear: External ear normal.      Left Ear: External ear normal.      Nose: Nose normal.   Eyes:      General: No scleral icterus.     Extraocular Movements: Extraocular movements intact.      Conjunctiva/sclera: Conjunctivae normal.   Cardiovascular:      Rate and Rhythm: Normal rate.   Pulmonary:      Effort: Pulmonary effort is normal.   Musculoskeletal:      Cervical back: Normal range of motion and neck supple.   Skin:     General: Skin is warm and dry.   Neurological:      Mental Status: He is alert and oriented to person, place, and time.   Psychiatric:         Behavior: Behavior normal.         Thought Content: Thought content normal.         Judgment: Judgment normal.                    Signed Electronically by: Ravi Prajapati MD    "

## 2024-06-06 NOTE — LETTER
June 14, 2024    Nikolai Kincaid  [address]  [place] [address]          Dear ,    We are writing to inform you of your test results.  Your A1c looks a lot better 8.9%.  This is down from 10.5% 4 months ago.  I strongly recommend that he follow-up with diabetic education to get this down further.  Keep up the good work.         Resulted Orders   Hemoglobin A1c   Result Value Ref Range    Hemoglobin A1C 8.9 (H) 0.0 - 5.6 %      Comment:      Normal <5.7%   Prediabetes 5.7-6.4%    Diabetes 6.5% or higher     Note: Adopted from ADA consensus guidelines.       If you have any questions or concerns, please call the clinic at the number listed above.       Sincerely,      Ravi Oneill MD

## 2024-06-24 ENCOUNTER — TELEPHONE (OUTPATIENT)
Dept: FAMILY MEDICINE | Facility: CLINIC | Age: 54
End: 2024-06-24

## 2024-06-24 NOTE — TELEPHONE ENCOUNTER
Patient Quality Outreach    Patient is due for the following:   Diabetes -  Eye Exam and Foot Exam  Hypertension -  BP check  Colon Cancer Screening  Physical Preventive Adult Physical    Next Steps:   No follow up needed at this time.    Type of outreach:    Chart review performed, no outreach needed.      Questions for provider review:    None           Esther Rincon CMA

## 2024-07-17 DIAGNOSIS — E11.59 TYPE 2 DIABETES MELLITUS WITH OTHER CIRCULATORY COMPLICATION, WITHOUT LONG-TERM CURRENT USE OF INSULIN (H): ICD-10-CM

## 2024-08-20 NOTE — PATIENT INSTRUCTIONS
Great job stopping regular soda!    Freestyle Kristie 2  Try to scan your sensor: when you wake up, before meals, and right before bed.  You can scan as much as you would like.     Support:   If you have any trouble with use or if the sensor falls off early, call the customer service number for Kristie located on the sensor's box. They can often help troubleshoot or replace sensor if needed.      Kristie Customer Service: [phone]   [email or web address].[email or web address]/us-en/support/sensor-support-form-questions.html. Keep your Kristie sensor box with lot and/or serial numbers.     2.  Diabetes Medications              -increase Lantus to 34 units once a day in the morning              -continue metformin as 1 tablet (1000 mg) twice a day     3.  Keep something with you for treating a low blood sugar (70 mg/dL or below).  If your blood sugar is low, eat/drink ONE of the following:   -1/2 cup juice  -1/2 cup regular soda  -1 package fruit snacks  -4 glucose tablets  Then, wait 15 minutes and re-check blood sugar.  If it is not above 70 mg/dL, repeat the above.     4. Meal Planning   Aim for 45-60 grams of carbohydrate per meal   Up to 15 grams of carbohydrate per snack   Use resources to count carbohydrates: food labels, written materials, Calorie Rodriguez web site or yonatan    5  Follow up with Jyoti on Friday, May 10th at 9:45 AM    6. Please call or send a [employer] message with any questions or concerns or low blood sugar    Jyoti Allen, MPH, RD, MARISOL, LD  Diabetes Education Triage Line: [phone]  Diabetes Education Appointment [phone]      atorvastatin

## 2024-08-27 DIAGNOSIS — I10 UNCONTROLLED HYPERTENSION: ICD-10-CM

## 2024-08-27 DIAGNOSIS — E11.59 TYPE 2 DIABETES MELLITUS WITH OTHER CIRCULATORY COMPLICATION, WITHOUT LONG-TERM CURRENT USE OF INSULIN (H): ICD-10-CM

## 2024-08-27 RX ORDER — NIFEDIPINE 60 MG/1
TABLET, EXTENDED RELEASE ORAL
Qty: 90 TABLET | Refills: 0 | Status: SHIPPED | OUTPATIENT
Start: 2024-08-27 | End: 2024-09-23

## 2024-08-28 DIAGNOSIS — I10 UNCONTROLLED HYPERTENSION: ICD-10-CM

## 2024-08-28 DIAGNOSIS — E11.59 TYPE 2 DIABETES MELLITUS WITH OTHER CIRCULATORY COMPLICATION, WITHOUT LONG-TERM CURRENT USE OF INSULIN (H): ICD-10-CM

## 2024-08-28 RX ORDER — CLOPIDOGREL BISULFATE 75 MG/1
TABLET ORAL
Qty: 90 TABLET | Refills: 1 | Status: SHIPPED | OUTPATIENT
Start: 2024-08-28

## 2024-09-03 DIAGNOSIS — E11.59 TYPE 2 DIABETES MELLITUS WITH OTHER CIRCULATORY COMPLICATION, WITHOUT LONG-TERM CURRENT USE OF INSULIN (H): ICD-10-CM

## 2024-09-03 DIAGNOSIS — I10 UNCONTROLLED HYPERTENSION: ICD-10-CM

## 2024-09-03 NOTE — LETTER
September 17, 2024      Nikolai Kincaid  [address] [place] [id number]        Dear Nikolai,     Your provider has denied a refill of losartan (COZAAR) 100 MG tablet . You are due for an appointment for further refills.  We ask that you schedule a visit with your provider. Please contact the clinic scheduling line at [phone]  to schedule an appointment.     Sincerely,     SADIA Santos/SARA

## 2024-09-05 RX ORDER — LOSARTAN POTASSIUM 100 MG/1
100 TABLET ORAL DAILY
Qty: 90 TABLET | Refills: 1 | OUTPATIENT
Start: 2024-09-05

## 2024-09-06 DIAGNOSIS — E11.59 TYPE 2 DIABETES MELLITUS WITH OTHER CIRCULATORY COMPLICATION, WITHOUT LONG-TERM CURRENT USE OF INSULIN (H): ICD-10-CM

## 2024-09-06 RX ORDER — INSULIN GLARGINE 100 [IU]/ML
INJECTION, SOLUTION SUBCUTANEOUS
Qty: 45 ML | Refills: 0 | Status: SHIPPED | OUTPATIENT
Start: 2024-09-06

## 2024-09-12 ENCOUNTER — TELEPHONE (OUTPATIENT)
Dept: FAMILY MEDICINE | Facility: CLINIC | Age: 54
End: 2024-09-12

## 2024-09-12 DIAGNOSIS — E11.59 TYPE 2 DIABETES MELLITUS WITH OTHER CIRCULATORY COMPLICATION, WITHOUT LONG-TERM CURRENT USE OF INSULIN (H): ICD-10-CM

## 2024-09-12 DIAGNOSIS — I10 UNCONTROLLED HYPERTENSION: ICD-10-CM

## 2024-09-13 RX ORDER — LOSARTAN POTASSIUM 100 MG/1
100 TABLET ORAL DAILY
Qty: 90 TABLET | Refills: 1 | OUTPATIENT
Start: 2024-09-13

## 2024-09-13 NOTE — TELEPHONE ENCOUNTER
Need to know pressures on new dose of medication.  Please help patient schedule either in person or virtual visit.    Ravi Prajapati MD

## 2024-09-16 NOTE — TELEPHONE ENCOUNTER
Called and left VM to return call to [phone]. If patient returns call please schedule follow up for blood pressure in person or virtual per message below. Esther Rincon MA

## 2024-09-18 NOTE — TELEPHONE ENCOUNTER
Called patient to schedule. No answer. LM for call back. If patient calls back please assist in scheduling     [facility]

## 2024-09-23 ENCOUNTER — VIRTUAL VISIT (OUTPATIENT)
Dept: FAMILY MEDICINE | Facility: CLINIC | Age: 54
End: 2024-09-23

## 2024-09-23 DIAGNOSIS — I10 UNCONTROLLED HYPERTENSION: ICD-10-CM

## 2024-09-23 DIAGNOSIS — E11.59 TYPE 2 DIABETES MELLITUS WITH OTHER CIRCULATORY COMPLICATION, WITHOUT LONG-TERM CURRENT USE OF INSULIN (H): ICD-10-CM

## 2024-09-23 PROCEDURE — 99443 PR PHYSICIAN TELEPHONE EVALUATION 21-30 MIN: CPT | Mod: 93 | Performed by: FAMILY MEDICINE

## 2024-09-23 RX ORDER — LANCETS
EACH MISCELLANEOUS
Qty: 100 EACH | Refills: 6 | Status: SHIPPED | OUTPATIENT
Start: 2024-09-23

## 2024-09-23 RX ORDER — ATORVASTATIN CALCIUM 80 MG/1
80 TABLET, FILM COATED ORAL AT BEDTIME
Qty: 90 TABLET | Refills: 1 | Status: SHIPPED | OUTPATIENT
Start: 2024-09-23

## 2024-09-23 RX ORDER — LOSARTAN POTASSIUM AND HYDROCHLOROTHIAZIDE 12.5; 1 MG/1; MG/1
1 TABLET ORAL DAILY
Qty: 90 TABLET | Refills: 1 | Status: SHIPPED | OUTPATIENT
Start: 2024-09-23

## 2024-09-23 RX ORDER — LOSARTAN POTASSIUM 100 MG/1
100 TABLET ORAL DAILY
Qty: 90 TABLET | Refills: 1 | Status: CANCELLED | OUTPATIENT
Start: 2024-09-23

## 2024-09-23 RX ORDER — NIFEDIPINE 60 MG/1
60 TABLET, EXTENDED RELEASE ORAL DAILY
Qty: 90 TABLET | Refills: 1 | Status: SHIPPED | OUTPATIENT
Start: 2024-09-23

## 2024-09-23 NOTE — PROGRESS NOTES
Nikolai is a 54 year old who is being evaluated via a billable telephone visit.    What phone number would you like to be contacted at? [phone]  How would you like to obtain your AVS? Mail a copy  Originating Location (pt. Location): Home    Distant Location (provider location):  On-site    Assessment & Plan       ICD-10-CM    1. Type 2 diabetes mellitus with other circulatory complication, without long-term current use of insulin (H)  E11.59 atorvastatin (LIPITOR) 80 MG tablet     NIFEdipine ER OSMOTIC (PROCARDIA XL) 60 MG 24 hr tablet     blood glucose monitoring (NO BRAND SPECIFIED) meter device kit     blood glucose (NO BRAND SPECIFIED) test strip     thin (NO BRAND SPECIFIED) lancets     Med Therapy Management Referral     metFORMIN (GLUCOPHAGE) 1000 MG tablet      2. Uncontrolled hypertension  I10 atorvastatin (LIPITOR) 80 MG tablet     losartan-hydrochlorothiazide (HYZAAR) 100-12.5 MG tablet     NIFEdipine ER OSMOTIC (PROCARDIA XL) 60 MG 24 hr tablet     Med Therapy Management Referral              Addition of hydrochlorothiazide  Follow-up with diabetic education and MTM  Increase insulin dose by 2units every 2 days until goal fasting glucose is met consistently    The longitudinal plan of care for the diagnosis(es)/condition(s) as documented were addressed during this visit. Due to the added complexity in care, I will continue to support Nikolai in the subsequent management and with ongoing continuity of care.   There are no Patient Instructions on file for this visit.    Subjective   Nikolai is a 54 year old, presenting for the following health issues:  Recheck Medication (Losartan)        9/23/2024     2:19 PM   Additional Questions   Roomed by Esther   Accompanied by musa         9/23/2024     2:19 PM   Patient Reported Additional Medications   Patient reports taking the following new medications none     Diabetes  Not checking sugars regularly  Not using CGM    Hypertension  Bp elevated  140-160/80's    History of Present Illness       Hypertension: He presents for follow up of hypertension.  He does check blood pressure  regularly outside of the clinic. Outside blood pressures have been over 140/90. He follows a low salt diet.     He eats 2-3 servings of fruits and vegetables daily.He consumes 0 sweetened beverage(s) daily.He exercises with enough effort to increase his heart rate 10 to 19 minutes per day.  He exercises with enough effort to increase his heart rate 3 or less days per week.   He is taking medications regularly.               Review of Systems  Constitutional, HEENT, cardiovascular, pulmonary, gi and gu systems are negative, except as otherwise noted.      Objective           Vitals:  No vitals were obtained today due to virtual visit.    Physical Exam   General: Alert and no distress //Respiratory: No audible wheeze, cough, or shortness of breath // Psychiatric:  Appropriate affect, tone, and pace of words            Phone call duration: 27 minutes  Signed Electronically by: Ravi Prajapati MD

## 2024-09-24 ENCOUNTER — TELEPHONE (OUTPATIENT)
Dept: FAMILY MEDICINE | Facility: CLINIC | Age: 54
End: 2024-09-24

## 2024-09-24 NOTE — LETTER
September 24, 2024    To  Nikolai Kincaid  [address] [place] [address]    Your team at [facility] cares about your health. We have reviewed your chart and based on our findings; we are making the following recommendations to better manage your health.     You are in particular need of attention regarding the following:     Schedule a DIABETIC FOLLOW UP appointment for Lab Only Visit. Patients with diabetes should see their provider regularly.  Schedule a DIABETIC EYE EXAM.  This exam is done with an optometrist, annually. You can schedule this appointment with your eye doctor.  If you need a referral, please let us know.  Call or MyChart message your clinic to schedule a colonoscopy, schedule/ a FIT Test, or order a Cologuard test. If you are unsure what type of test you need, please call your clinic and speak to clinic staff.   Colon cancer is now the second leading cause of cancer-related deaths in the [place] for both men and women and there are over 130,000 new cases and 50,000 deaths per year from colon cancer. Colonoscopies can prevent 90-95% of these deaths. Problem lesions can be removed before they ever become cancer. This test is not only looking for cancer, but also getting rid of precancerous lesions.   If you are under/uninsured, we recommend you contact the [employer] Program.[employer] is a free colorectal cancer screening program that provides colonoscopies for eligible under/uninsured [place] men and women. If you are interested in receiving a free colonoscopy, please call [employer] at t [phone] (mention code [employer]) to see if you're eligible. Please have them send us the results.   Please call [phone] to schedule a colonoscopy.  PREVENTATIVE VISIT: Physical    If you have already completed these items, please contact the clinic via phone or   [employer] so your care team can review and update your records. Thank you for   choosing [facility]  Clinics for your healthcare needs. For any questions,   concerns, or to schedule an appointment please contact our clinic.    Healthy Regards,      Your [facility] Team

## 2024-09-24 NOTE — TELEPHONE ENCOUNTER
Patient Quality Outreach    Patient is due for the following:   Diabetes -  A1C, Eye Exam, and Foot Exam  Colon Cancer Screening  Physical Preventive Adult Physical    Next Steps:   Schedule a lab only visit for A1C    Type of outreach:    Sent letter.      Questions for provider review:    None           Esther Rincon [facility]

## 2024-09-25 ENCOUNTER — TELEPHONE (OUTPATIENT)
Dept: FAMILY MEDICINE | Facility: CLINIC | Age: 54
End: 2024-09-25

## 2024-09-25 NOTE — LETTER
September 25, 2024      Nikolai Kincaid  [address] [place] [id number]        Dear Nikolai,            Dr. Oneill has recommended you schedule a Medication Therapy Management (MTM) appointment. MTM is designed to help you get the most of out of your medicines.      During an MTM appointment a specially trained pharmacist will review all of your medicines, both prescription and over-the-counter. They will make sure your medicines are the best choice for you and are safe and convenient for you.  MTM pharmacists work together with you and your doctor to help you understand your medicines, solve any problems related to your medicines and help you get the best results from taking your medicines.      At [facility], we strongly believe in a team approach to health care. We want to help you understand your medicines and health conditions. To learn more about how you might benefit from MTM services, watch the patient video at [email or web address].      To make an appointment, please call the MTM scheduling line at [phone] (toll-free at [phone]).     We look forward to hearing from you!           [facility] MTM Team

## 2024-09-25 NOTE — Clinical Note
Please print and mail MTM letter, thanks!  Mary Ann Mayer, PharmD Medication Therapy Management Pharmacist

## 2024-09-25 NOTE — TELEPHONE ENCOUNTER
MTM referral from: [facility] visit (referral by provider)    MTM referral outreach attempt #2 on September 25, 2024 at 12:49 PM      Outcome: Patient not reachable after several attempts, routed to Pharmacist Team/Provider as an FYI    Use private pay for the carrier/Plan on the flowsheet      MT Practitioner please send patient letter    Sherry Alejandro [place]   [phone]

## 2025-01-26 DIAGNOSIS — E11.59 TYPE 2 DIABETES MELLITUS WITH OTHER CIRCULATORY COMPLICATION, WITHOUT LONG-TERM CURRENT USE OF INSULIN (H): ICD-10-CM

## 2025-01-27 RX ORDER — INSULIN GLARGINE 100 [IU]/ML
INJECTION, SOLUTION SUBCUTANEOUS
Qty: 45 ML | OUTPATIENT
Start: 2025-01-27

## 2025-01-28 NOTE — TELEPHONE ENCOUNTER
Called patient no answer unable to leave voicemail due to it being full please try calling back later to schedule an appt for refills       Thank you  LS

## 2025-02-10 DIAGNOSIS — E11.59 TYPE 2 DIABETES MELLITUS WITH OTHER CIRCULATORY COMPLICATION, WITHOUT LONG-TERM CURRENT USE OF INSULIN (H): ICD-10-CM

## 2025-02-10 DIAGNOSIS — I10 UNCONTROLLED HYPERTENSION: ICD-10-CM

## 2025-02-11 RX ORDER — POTASSIUM CHLORIDE 1500 MG/1
TABLET, EXTENDED RELEASE ORAL
Qty: 360 TABLET | Refills: 1 | Status: SHIPPED | OUTPATIENT
Start: 2025-02-11

## 2025-02-24 DIAGNOSIS — E11.59 TYPE 2 DIABETES MELLITUS WITH OTHER CIRCULATORY COMPLICATION, WITHOUT LONG-TERM CURRENT USE OF INSULIN (H): ICD-10-CM

## 2025-02-25 RX ORDER — INSULIN GLARGINE 100 [IU]/ML
INJECTION, SOLUTION SUBCUTANEOUS
Qty: 45 ML | OUTPATIENT
Start: 2025-02-25

## 2025-03-03 ENCOUNTER — OFFICE VISIT (OUTPATIENT)
Dept: FAMILY MEDICINE | Facility: CLINIC | Age: 55
End: 2025-03-03

## 2025-03-03 VITALS
HEIGHT: 69 IN | HEART RATE: 106 BPM | SYSTOLIC BLOOD PRESSURE: 153 MMHG | WEIGHT: 244 LBS | TEMPERATURE: 98.4 F | DIASTOLIC BLOOD PRESSURE: 82 MMHG | RESPIRATION RATE: 16 BRPM | BODY MASS INDEX: 36.14 KG/M2 | OXYGEN SATURATION: 96 %

## 2025-03-03 DIAGNOSIS — E11.9 TYPE 2 DIABETES MELLITUS WITHOUT COMPLICATION, WITHOUT LONG-TERM CURRENT USE OF INSULIN (H): Primary | ICD-10-CM

## 2025-03-03 DIAGNOSIS — E11.59 TYPE 2 DIABETES MELLITUS WITH OTHER CIRCULATORY COMPLICATION, WITHOUT LONG-TERM CURRENT USE OF INSULIN (H): ICD-10-CM

## 2025-03-03 DIAGNOSIS — Z11.59 NEED FOR HEPATITIS C SCREENING TEST: ICD-10-CM

## 2025-03-03 DIAGNOSIS — I10 UNCONTROLLED HYPERTENSION: ICD-10-CM

## 2025-03-03 DIAGNOSIS — Z12.11 SCREEN FOR COLON CANCER: ICD-10-CM

## 2025-03-03 DIAGNOSIS — I16.1 HYPERTENSIVE EMERGENCY: ICD-10-CM

## 2025-03-03 LAB
ANION GAP SERPL CALCULATED.3IONS-SCNC: 11 MMOL/L (ref 7–15)
BUN SERPL-MCNC: 12.7 MG/DL (ref 6–20)
CALCIUM SERPL-MCNC: 9 MG/DL (ref 8.8–10.4)
CHLORIDE SERPL-SCNC: 104 MMOL/L (ref 98–107)
CHOLEST SERPL-MCNC: 167 MG/DL
CREAT SERPL-MCNC: 0.63 MG/DL (ref 0.67–1.17)
CREAT UR-MCNC: 34.7 MG/DL
EGFRCR SERPLBLD CKD-EPI 2021: >90 ML/MIN/1.73M2
EST. AVERAGE GLUCOSE BLD GHB EST-MCNC: 272 MG/DL
FASTING STATUS PATIENT QL REPORTED: NO
FASTING STATUS PATIENT QL REPORTED: NO
GLUCOSE SERPL-MCNC: 408 MG/DL (ref 70–99)
HBA1C MFR BLD: 11.1 % (ref 0–5.6)
HCO3 SERPL-SCNC: 24 MMOL/L (ref 22–29)
HCV AB SERPL QL IA: NONREACTIVE
HDLC SERPL-MCNC: 38 MG/DL
LDLC SERPL CALC-MCNC: 84 MG/DL
MICROALBUMIN UR-MCNC: 419 MG/L
MICROALBUMIN/CREAT UR: 1207.49 MG/G CR (ref 0–17)
NONHDLC SERPL-MCNC: 129 MG/DL
POTASSIUM SERPL-SCNC: 3.8 MMOL/L (ref 3.4–5.3)
SODIUM SERPL-SCNC: 139 MMOL/L (ref 135–145)
TRIGL SERPL-MCNC: 225 MG/DL

## 2025-03-03 PROCEDURE — 80048 BASIC METABOLIC PNL TOTAL CA: CPT | Performed by: FAMILY MEDICINE

## 2025-03-03 PROCEDURE — 86803 HEPATITIS C AB TEST: CPT | Performed by: FAMILY MEDICINE

## 2025-03-03 PROCEDURE — 80061 LIPID PANEL: CPT | Performed by: FAMILY MEDICINE

## 2025-03-03 PROCEDURE — G2211 COMPLEX E/M VISIT ADD ON: HCPCS | Performed by: FAMILY MEDICINE

## 2025-03-03 PROCEDURE — 99214 OFFICE O/P EST MOD 30 MIN: CPT | Performed by: FAMILY MEDICINE

## 2025-03-03 PROCEDURE — 82570 ASSAY OF URINE CREATININE: CPT | Performed by: FAMILY MEDICINE

## 2025-03-03 PROCEDURE — 83036 HEMOGLOBIN GLYCOSYLATED A1C: CPT | Performed by: FAMILY MEDICINE

## 2025-03-03 PROCEDURE — 36415 COLL VENOUS BLD VENIPUNCTURE: CPT | Performed by: FAMILY MEDICINE

## 2025-03-03 PROCEDURE — 82043 UR ALBUMIN QUANTITATIVE: CPT | Performed by: FAMILY MEDICINE

## 2025-03-03 RX ORDER — LOSARTAN POTASSIUM AND HYDROCHLOROTHIAZIDE 25; 100 MG/1; MG/1
1 TABLET ORAL DAILY
Status: CANCELLED | OUTPATIENT
Start: 2025-03-03

## 2025-03-03 NOTE — PROGRESS NOTES
"  Assessment & Plan       ICD-10-CM    1. Type 2 diabetes mellitus without complication, without long-term current use of insulin (H)  E11.9 HEMOGLOBIN A1C     Lipid panel reflex to direct LDL Non-fasting     Albumin Random Urine Quantitative with Creat Ratio     HEMOGLOBIN A1C     Lipid panel reflex to direct LDL Non-fasting     Albumin Random Urine Quantitative with Creat Ratio      2. Hypertensive emergency  I16.1 BASIC METABOLIC PANEL     BASIC METABOLIC PANEL      3. Screen for colon cancer  Z12.11       4. Need for hepatitis C screening test  Z11.59 Hepatitis C Screen Reflex to HCV RNA Quant and Genotype     Hepatitis C Screen Reflex to HCV RNA Quant and Genotype      5. Uncontrolled hypertension  I10 losartan-hydrochlorothiazide (HYZAAR) 100-12.5 MG tablet     NIFEdipine ER OSMOTIC (PROCARDIA XL) 90 MG 24 hr tablet      6. Type 2 diabetes mellitus with other circulatory complication, without long-term current use of insulin (H)  E11.59 insulin glargine 100 UNIT/ML pen     metFORMIN (GLUCOPHAGE) 1000 MG tablet            The longitudinal plan of care for the diagnosis(es)/condition(s) as documented were addressed during this visit. Due to the added complexity in care, I will continue to support Nikolai in the subsequent management and with ongoing continuity of care.     Patient currently does not have insurance coverage until July  Difficult to safely provide medication dosing recommendations without home readings for both glucose and blood pressure  At last visit was recommended that he increase his insulin dose to 34 units and titrate up 2 units/2 days.  Dose was increased without titration.      BMI  Estimated body mass index is 36.02 kg/m  as calculated from the following:    Height as of this encounter: 1.753 m (5' 9.02\").    Weight as of this encounter: 110.7 kg (244 lb).   Weight management plan: Discussed healthy diet and exercise guidelines      There are no Patient Instructions on file for this " visit.    Angela Menchaca is a 54 year old, presenting for the following health issues:  Diabetes        3/3/2025    11:07 AM   Additional Questions   Roomed by An V.         3/3/2025    11:07 AM   Patient Reported Additional Medications   Patient reports taking the following new medications none     History of Present Illness       Diabetes:   He presents for follow up of diabetes.    He is not checking blood glucose.         He has no concerns regarding his diabetes at this time.   He is not experiencing numbness or burning in feet, excessive thirst, blurry vision, weight changes or redness, sores or blisters on feet. The patient has not had a diabetic eye exam in the last 12 months.          Hyperlipidemia:  He presents for follow up of hyperlipidemia.   He is taking medication to lower cholesterol. He is not having myalgia or other side effects to statin medications.    Hypertension: He presents for follow up of hypertension.  He does not check blood pressure  regularly outside of the clinic. Outpatient blood pressures have not been over 140/90. He follows a low salt diet.     He eats 2-3 servings of fruits and vegetables daily.He consumes 6 sweetened beverage(s) daily.   He is taking medications regularly.      BP Readings from Last 6 Encounters:   03/03/25 (!) 153/82   06/06/24 (!) 153/96   02/08/24 (!) 147/87   01/06/23 138/86   12/20/22 (!) 154/96     Hemoglobin A1C   Date Value Ref Range Status   03/03/2025 11.1 (H) 0.0 - 5.6 % Final     Comment:     Normal <5.7%   Prediabetes 5.7-6.4%    Diabetes 6.5% or higher     Note: Adopted from ADA consensus guidelines.   06/06/2024 8.9 (H) 0.0 - 5.6 % Final     Comment:     Normal <5.7%   Prediabetes 5.7-6.4%    Diabetes 6.5% or higher     Note: Adopted from ADA consensus guidelines.   02/08/2024 10.5 (H) 0.0 - 5.6 % Final     Patient does not check glucose at home    Patient does not check blood pressure at home            Review of Systems  Constitutional,  "HEENT, cardiovascular, pulmonary, gi and gu systems are negative, except as otherwise noted.      Objective    BP (!) 153/82   Pulse 106   Temp 98.4  F (36.9  C) (Temporal)   Resp 16   Ht 1.753 m (5' 9.02\")   Wt 110.7 kg (244 lb)   SpO2 96%   BMI 36.02 kg/m    Body mass index is 36.02 kg/m .  Physical Exam  Constitutional:       General: He is not in acute distress.     Appearance: Normal appearance. He is well-developed. He is not ill-appearing.   HENT:      Head: Normocephalic and atraumatic.      Right Ear: External ear normal.      Left Ear: External ear normal.      Nose: Nose normal.   Eyes:      General: No scleral icterus.     Extraocular Movements: Extraocular movements intact.      Conjunctiva/sclera: Conjunctivae normal.   Cardiovascular:      Rate and Rhythm: Normal rate.   Pulmonary:      Effort: Pulmonary effort is normal.   Musculoskeletal:      Cervical back: Normal range of motion and neck supple.   Skin:     General: Skin is warm and dry.   Neurological:      Mental Status: He is alert and oriented to person, place, and time.   Psychiatric:         Behavior: Behavior normal.         Thought Content: Thought content normal.         Judgment: Judgment normal.                    Signed Electronically by: Ravi Prajapati MD    "

## 2025-03-04 RX ORDER — LOSARTAN POTASSIUM AND HYDROCHLOROTHIAZIDE 12.5; 1 MG/1; MG/1
1 TABLET ORAL DAILY
Qty: 90 TABLET | Refills: 1 | Status: SHIPPED | OUTPATIENT
Start: 2025-03-04

## 2025-03-04 RX ORDER — NIFEDIPINE 90 MG/1
90 TABLET, EXTENDED RELEASE ORAL DAILY
Qty: 90 TABLET | Refills: 1 | Status: SHIPPED | OUTPATIENT
Start: 2025-03-04

## 2025-03-04 RX ORDER — INSULIN GLARGINE 100 [IU]/ML
INJECTION, SOLUTION SUBCUTANEOUS
Qty: 45 ML | Refills: 0 | Status: SHIPPED | OUTPATIENT
Start: 2025-03-04

## 2025-03-05 DIAGNOSIS — E11.9 TYPE 2 DIABETES MELLITUS WITHOUT COMPLICATION, WITHOUT LONG-TERM CURRENT USE OF INSULIN (H): Primary | ICD-10-CM

## 2025-03-05 DIAGNOSIS — R80.1 PERSISTENT PROTEINURIA: ICD-10-CM

## 2025-03-23 DIAGNOSIS — E11.59 TYPE 2 DIABETES MELLITUS WITH OTHER CIRCULATORY COMPLICATION, WITHOUT LONG-TERM CURRENT USE OF INSULIN (H): ICD-10-CM

## 2025-03-23 DIAGNOSIS — I10 UNCONTROLLED HYPERTENSION: ICD-10-CM

## 2025-03-24 RX ORDER — ATORVASTATIN CALCIUM 80 MG/1
80 TABLET, FILM COATED ORAL AT BEDTIME
Qty: 90 TABLET | Refills: 3 | Status: SHIPPED | OUTPATIENT
Start: 2025-03-24

## 2025-04-15 DIAGNOSIS — I10 UNCONTROLLED HYPERTENSION: ICD-10-CM

## 2025-04-15 DIAGNOSIS — E11.59 TYPE 2 DIABETES MELLITUS WITH OTHER CIRCULATORY COMPLICATION, WITHOUT LONG-TERM CURRENT USE OF INSULIN (H): ICD-10-CM

## 2025-04-16 RX ORDER — CLOPIDOGREL BISULFATE 75 MG/1
TABLET ORAL
Qty: 90 TABLET | Refills: 1 | Status: SHIPPED | OUTPATIENT
Start: 2025-04-16

## 2025-06-16 ENCOUNTER — OFFICE VISIT (OUTPATIENT)
Dept: FAMILY MEDICINE | Facility: CLINIC | Age: 55
End: 2025-06-16
Payer: COMMERCIAL

## 2025-06-16 VITALS
HEIGHT: 67 IN | WEIGHT: 236.6 LBS | SYSTOLIC BLOOD PRESSURE: 155 MMHG | BODY MASS INDEX: 37.13 KG/M2 | OXYGEN SATURATION: 98 % | TEMPERATURE: 98.2 F | RESPIRATION RATE: 16 BRPM | DIASTOLIC BLOOD PRESSURE: 93 MMHG | HEART RATE: 108 BPM

## 2025-06-16 DIAGNOSIS — E11.59 TYPE 2 DIABETES MELLITUS WITH OTHER CIRCULATORY COMPLICATION, WITHOUT LONG-TERM CURRENT USE OF INSULIN (H): ICD-10-CM

## 2025-06-16 DIAGNOSIS — Z13.220 ENCOUNTER FOR LIPID SCREENING FOR CARDIOVASCULAR DISEASE: ICD-10-CM

## 2025-06-16 DIAGNOSIS — Z12.11 SCREEN FOR COLON CANCER: ICD-10-CM

## 2025-06-16 DIAGNOSIS — Z12.5 SCREENING FOR PROSTATE CANCER: ICD-10-CM

## 2025-06-16 DIAGNOSIS — Z13.6 ENCOUNTER FOR LIPID SCREENING FOR CARDIOVASCULAR DISEASE: ICD-10-CM

## 2025-06-16 DIAGNOSIS — Z00.00 WELL ADULT EXAM: Primary | ICD-10-CM

## 2025-06-16 DIAGNOSIS — Z00.00 ENCOUNTER FOR MEDICARE ANNUAL WELLNESS EXAM: ICD-10-CM

## 2025-06-16 DIAGNOSIS — E11.9 TYPE 2 DIABETES MELLITUS WITHOUT COMPLICATION, WITHOUT LONG-TERM CURRENT USE OF INSULIN (H): ICD-10-CM

## 2025-06-16 DIAGNOSIS — I42.8 NONISCHEMIC CARDIOMYOPATHY (H): ICD-10-CM

## 2025-06-16 DIAGNOSIS — I10 UNCONTROLLED HYPERTENSION: ICD-10-CM

## 2025-06-16 DIAGNOSIS — R73.9 HYPERGLYCEMIA: ICD-10-CM

## 2025-06-16 LAB
ALBUMIN SERPL BCG-MCNC: 4.2 G/DL (ref 3.5–5.2)
ALP SERPL-CCNC: 132 U/L (ref 40–150)
ALT SERPL W P-5'-P-CCNC: 27 U/L (ref 0–70)
ANION GAP SERPL CALCULATED.3IONS-SCNC: 10 MMOL/L (ref 7–15)
AST SERPL W P-5'-P-CCNC: 23 U/L (ref 0–45)
BILIRUB SERPL-MCNC: 0.5 MG/DL
BUN SERPL-MCNC: 11.1 MG/DL (ref 6–20)
CALCIUM SERPL-MCNC: 8.9 MG/DL (ref 8.8–10.4)
CHLORIDE SERPL-SCNC: 103 MMOL/L (ref 98–107)
CHOLEST SERPL-MCNC: 186 MG/DL
CREAT SERPL-MCNC: 0.73 MG/DL (ref 0.67–1.17)
EGFRCR SERPLBLD CKD-EPI 2021: >90 ML/MIN/1.73M2
EST. AVERAGE GLUCOSE BLD GHB EST-MCNC: 269 MG/DL
FASTING STATUS PATIENT QL REPORTED: ABNORMAL
FASTING STATUS PATIENT QL REPORTED: ABNORMAL
GLUCOSE SERPL-MCNC: 366 MG/DL (ref 70–99)
HBA1C MFR BLD: 11 % (ref 0–5.6)
HCO3 SERPL-SCNC: 25 MMOL/L (ref 22–29)
HDLC SERPL-MCNC: 37 MG/DL
LDLC SERPL CALC-MCNC: 83 MG/DL
NONHDLC SERPL-MCNC: 149 MG/DL
POTASSIUM SERPL-SCNC: 3.3 MMOL/L (ref 3.4–5.3)
PROT SERPL-MCNC: 7.2 G/DL (ref 6.4–8.3)
PSA SERPL DL<=0.01 NG/ML-MCNC: 0.12 NG/ML (ref 0–3.5)
SODIUM SERPL-SCNC: 138 MMOL/L (ref 135–145)
TRIGL SERPL-MCNC: 329 MG/DL

## 2025-06-16 PROCEDURE — 36415 COLL VENOUS BLD VENIPUNCTURE: CPT | Performed by: FAMILY MEDICINE

## 2025-06-16 PROCEDURE — 83036 HEMOGLOBIN GLYCOSYLATED A1C: CPT | Performed by: FAMILY MEDICINE

## 2025-06-16 PROCEDURE — 80061 LIPID PANEL: CPT | Performed by: FAMILY MEDICINE

## 2025-06-16 PROCEDURE — 82088 ASSAY OF ALDOSTERONE: CPT | Performed by: FAMILY MEDICINE

## 2025-06-16 PROCEDURE — G0103 PSA SCREENING: HCPCS | Performed by: FAMILY MEDICINE

## 2025-06-16 PROCEDURE — 99000 SPECIMEN HANDLING OFFICE-LAB: CPT | Performed by: FAMILY MEDICINE

## 2025-06-16 PROCEDURE — 80053 COMPREHEN METABOLIC PANEL: CPT | Performed by: FAMILY MEDICINE

## 2025-06-16 PROCEDURE — 84244 ASSAY OF RENIN: CPT | Mod: 90 | Performed by: FAMILY MEDICINE

## 2025-06-16 RX ORDER — LOSARTAN POTASSIUM AND HYDROCHLOROTHIAZIDE 25; 100 MG/1; MG/1
1 TABLET ORAL DAILY
Qty: 90 TABLET | Refills: 1 | Status: SHIPPED | OUTPATIENT
Start: 2025-06-16

## 2025-06-16 RX ORDER — HYDROCHLOROTHIAZIDE 12.5 MG/1
CAPSULE ORAL
Qty: 6 EACH | Refills: 3 | Status: SHIPPED | OUTPATIENT
Start: 2025-06-16

## 2025-06-16 RX ORDER — KETOROLAC TROMETHAMINE 30 MG/ML
1 INJECTION, SOLUTION INTRAMUSCULAR; INTRAVENOUS ONCE
Qty: 1 EACH | Refills: 0 | Status: SHIPPED | OUTPATIENT
Start: 2025-06-16 | End: 2025-06-16

## 2025-06-16 RX ORDER — INSULIN GLARGINE 100 [IU]/ML
INJECTION, SOLUTION SUBCUTANEOUS
Qty: 45 ML | Refills: 0 | Status: SHIPPED | OUTPATIENT
Start: 2025-06-16

## 2025-06-16 SDOH — HEALTH STABILITY: PHYSICAL HEALTH: ON AVERAGE, HOW MANY MINUTES DO YOU ENGAGE IN EXERCISE AT THIS LEVEL?: 30 MIN

## 2025-06-16 SDOH — HEALTH STABILITY: PHYSICAL HEALTH: ON AVERAGE, HOW MANY DAYS PER WEEK DO YOU ENGAGE IN MODERATE TO STRENUOUS EXERCISE (LIKE A BRISK WALK)?: 3 DAYS

## 2025-06-16 ASSESSMENT — SOCIAL DETERMINANTS OF HEALTH (SDOH): HOW OFTEN DO YOU GET TOGETHER WITH FRIENDS OR RELATIVES?: THREE TIMES A WEEK

## 2025-06-16 NOTE — PROGRESS NOTES
Preventive Care Visit  [facility] YOGESH Prajapati MD, Family Medicine  Jun 16, 2025      Assessment & Plan       ICD-10-CM    1. Well adult exam  Z00.00       2. Type 2 diabetes mellitus without complication, without long-term current use of insulin (H)  E11.9 Adult Eye  Referral     Comprehensive metabolic panel     Lipid panel reflex to direct LDL Fasting     Continuous Glucose  (FREESTYLE MIRELLA 3 READER) ILYA     Continuous Glucose Sensor (FREESTYLE MIRELLA 3 PLUS SENSOR) MISC     tirzepatide (MOUNJARO) 2.5 MG/0.5ML SOAJ auto-injector pen     Comprehensive metabolic panel     Lipid panel reflex to direct LDL Fasting     DISCONTINUED: tirzepatide-Weight Management (ZEPBOUND) 2.5 MG/0.5ML prefilled pen      3. Screen for colon cancer  Z12.11 Colonoscopy Screening  Referral      4. Uncontrolled hypertension  I10 losartan-hydrochlorothiazide (HYZAAR) 100-25 MG tablet     Aldosterone     Renin activity     Aldosterone Renin Ratio     Potassium     Aldosterone Renin Ratio     CANCELED: Aldosterone     CANCELED: Renin activity     CANCELED: Potassium      5. Encounter for lipid screening for cardiovascular disease  Z13.220     Z13.6       6. Screening for prostate cancer  Z12.5 Prostate Specific Antigen Screen     Prostate Specific Antigen Screen      7. Hyperglycemia  R73.9 Hemoglobin A1c     Hemoglobin A1c      8. Type 2 diabetes mellitus with other circulatory complication, without long-term current use of insulin (H)  E11.59 insulin glargine 100 UNIT/ML pen      9. Nonischemic cardiomyopathy (H)  I42.8 tirzepatide (MOUNJARO) 2.5 MG/0.5ML SOAJ auto-injector pen      10. Encounter for Medicare annual wellness exam  Z00.00             Patient has been advised of split billing requirements and indicates understanding: Yes      Reviewed preventive health counseling, as reflected in patient instructions       Regular exercise       Healthy  diet/nutrition  Counseling  Appropriate preventive services were addressed with this patient via screening, questionnaire, or discussion as appropriate for fall prevention, nutrition, physical activity, Tobacco-use cessation, social engagement, weight loss and cognition.  Checklist reviewing preventive services available has been given to the patient.  Reviewed patient's diet, addressing concerns and/or questions.   He is at risk for lack of exercise and has been provided with information to increase physical activity for the benefit of his well-being.   The patient was instructed to see the dentist every 6 months.   The patient reports drinking more than 3 alcoholic drinks per day and/or more than 7 drhnks per week. The patient was counseled and given information about possible harmful effects of excessive alcohol intake.    Patient Instructions   Nikolai    It was a pleasure seeing you in clinic today.  Here's the plan:    Bp medication dose increased - follow-up in 1-3 months.  Check pressures.  Mounjaro prescribed - follow-up in 1-3 months to discuss dose increase  Freestyle jade 3 - start using to monitor sugars    Let me know if you have questions.    Ravi Prajapati MD    Patient Education  Preventive Care Advice   This is general advice given by our system to help you stay healthy. However, your care team may have specific advice just for you. Please talk to your care team about your preventive care needs.  Nutrition  Eat 5 or more servings of fruits and vegetables each day.  Try wheat bread, brown rice and whole grain pasta (instead of white bread, rice, and pasta).  Get enough calcium and vitamin D. Check the label on foods and aim for 100% of the RDA (recommended daily allowance).  Lifestyle  Exercise at least 150 minutes each week  (30 minutes a day, 5 days a week).  Do muscle strengthening activities 2 days a week. These help control your weight and prevent disease.  No smoking.  Wear sunscreen to  prevent skin cancer.  Have a dental exam and cleaning every 6 months.  Yearly exams  See your health care team every year to talk about:  Any changes in your health.  Any medicines your care team has prescribed.  Preventive care, family planning, and ways to prevent chronic diseases.  Shots (vaccines)   HPV shots (up to age 26), if you've never had them before.  Hepatitis B shots (up to age 59), if you've never had them before.  COVID-19 shot: Get this shot when it's due.  Flu shot: Get a flu shot every year.  Tetanus shot: Get a tetanus shot every 10 years.  Pneumococcal, hepatitis A, and RSV shots: Ask your care team if you need these based on your risk.  Shingles shot (for age 50 and up)  General health tests  Diabetes screening:  Starting at age 35, Get screened for diabetes at least every 3 years.  If you are younger than age 35, ask your care team if you should be screened for diabetes.  Cholesterol test: At age 39, start having a cholesterol test every 5 years, or more often if advised.  Bone density scan (DEXA): At age 50, ask your care team if you should have this scan for osteoporosis (brittle bones).  Hepatitis C: Get tested at least once in your life.  STIs (sexually transmitted infections)  Before age 24: Ask your care team if you should be screened for STIs.  After age 24: Get screened for STIs if you're at risk. You are at risk for STIs (including HIV) if:  You are sexually active with more than one person.  You don't use condoms every time.  You or a partner was diagnosed with a sexually transmitted infection.  If you are at risk for HIV, ask about PrEP medicine to prevent HIV.  Get tested for HIV at least once in your life, whether you are at risk for HIV or not.  Cancer screening tests  Cervical cancer screening: If you have a cervix, begin getting regular cervical cancer screening tests starting at age 21.  Breast cancer scan (mammogram): If you've ever had breasts, begin having regular mammograms  "starting at age 40. This is a scan to check for breast cancer.  Colon cancer screening: It is important to start screening for colon cancer at age 45.  Have a colonoscopy test every 10 years (or more often if you're at risk) Or, ask your provider about stool tests like a FIT test every year or Cologuard test every 3 years.  To learn more about your testing options, visit:   .  For help making a decision, visit:   https://bit.ly/yv88917.  Prostate cancer screening test: If you have a prostate, ask your care team if a prostate cancer screening test (PSA) at age 55 is right for you.  Lung cancer screening: If you are a current or former smoker ages 50 to 80, ask your care team if ongoing lung cancer screenings are right for you.  For informational purposes only. Not to replace the advice of your health care provider. Copyright   2023 [facility]. All rights reserved. Clinically reviewed by the [facility] Transitions Program. [employer] 764643 - REV 01/24.  9 Ways to Cut Back on Drinking  Maybe you've found yourself drinking more alcohol than you'd prefer. If you want to cut back, here are some ideas to try.    Think before you drink.  Do you really want a drink, or is it just a habit? If you're used to having a drink at a certain time, try doing something else then.     Look for substitutes.  Find some no-alcohol drinks that you enjoy, like flavored seltzer water, tea with honey, or tonic with a slice of lime. Or try alcohol-free beer or \"virgin\" cocktails (without the alcohol).     Drink more water.  Use water to quench your thirst. Drink a glass of water before you have any alcohol. Have another glass along with every drink or between drinks.     Shrink your drink.  For example, have a bottle of beer instead of a pint. Use a smaller glass for wine. Choose drinks with lower alcohol content (ABV%). Or use less liquor and more mixer in cocktails.     Slow down.  It's easy to drink quickly and without " "thinking about it. Pay attention, and make each drink last longer.     Do the math.  Total up how much you spend on alcohol each month. How much is that a year? If you cut back, what could you do with the money you save?     Take a break.  Choose a day or two each week when you won't drink at all. Notice how you feel on those days, physically and emotionally. How did you sleep? Do you feel better? Over time, add more break days.     Count calories.  Would you like to lose some weight? For some people that's a good motivator for cutting back. Figure out how many calories are in each drink. How many does that add up to in a day? In a week? In a month?     Practice saying no.  Be ready when someone offers you a drink. Try: \"Thanks, I've had enough.\" Or \"Thanks, but I'm cutting back.\" Or \"No, thanks. I feel better when I drink less.\"   Current as of: August 20, 2024  Content Version: 14.5    3806-5666 [employer].   Care instructions adapted under license by your healthcare professional. If you have questions about a medical condition or this instruction, always ask your healthcare professional. [employer] disclaims any warranty or liability for your use of this information.  Eating Healthy Foods: Care Instructions  With every meal, you can make healthy food choices. Try to eat a variety of fruits, vegetables, whole grains, lean proteins, and low-fat dairy products. This can help you get the right balance of nutrients, including vitamins and minerals. Small changes add up over time. You can start by adding one healthy food to your meals each day.    Try to make half your plate fruits and vegetables, one-fourth whole grains, and one-fourth lean proteins. Try including dairy with your meals.   Eat more fruits and vegetables. Try to have them with most meals and snacks.   Foods for healthy eating        Fruits   These can be fresh, frozen, canned, or dried.  Try to choose whole fruit rather than " "fruit juice.  Eat a variety of colors.        Vegetables   These can be fresh, frozen, canned, or dried.  Beans, peas, and lentils count too.        Whole grains   Choose whole-grain breads, cereals, and noodles.  Try brown rice.        Lean proteins   These can include lean meat, poultry, fish, and eggs.  You can also have tofu, beans, peas, lentils, nuts, and seeds.        Dairy   Try milk, yogurt, and cheese.  Choose low-fat or fat-free when you can.  If you need to, use lactose-free milk or fortified plant-based milk products, such as soy milk.        Water   Drink water when you're thirsty.  Limit sugar-sweetened drinks, including soda, fruit drinks, and sports drinks.  Where can you learn more?  Go to https://www.[employer].net/patiented  Enter T756 in the search box to learn more about \"Eating Healthy Foods: Care Instructions.\"  Current as of: October 7, 2024  Content Version: 14.5 2024-2025 "[employer].   Care instructions adapted under license by your healthcare professional. If you have questions about a medical condition or this instruction, always ask your healthcare professional. "[employer] disclaims any warranty or liability for your use of this information.    Learning About Being Physically Active  What is physical activity?     Being physically active means doing any kind of activity that gets your body moving.  The types of physical activity that can help you get fit and stay healthy include:  Aerobic or \"cardio\" activities. These make your heart beat faster and make you breathe harder, such as brisk walking, riding a bike, or running. They strengthen your heart and lungs and build up your endurance.  Strength training activities. These make your muscles work against, or \"resist,\" something. Examples include lifting weights or doing push-ups. These activities help tone and strengthen your muscles and bones.  Stretches. These let you move your joints and muscles through " their full range of motion. Stretching helps you be more flexible.  Reaching a balance between these three types of physical activity is important because each one contributes to your overall fitness.  What are the benefits of being active?  Being active is one of the best things you can do for your health. It helps you to:  Feel stronger and have more energy to do all the things you like to do.  Focus better at school or work.  Feel, think, and sleep better.  Reach and stay at a healthy weight.  Lose fat and build lean muscle.  Lower your risk for serious health problems, including diabetes, heart attack, high blood pressure, and some cancers.  Keep your heart, lungs, bones, muscles, and joints strong and healthy.  How can you make being active part of your life?  Start slowly. Make it your long-term goal to get at least 30 minutes of exercise on most days of the week. Walking is a good choice. You also may want to do other activities, such as running, swimming, cycling, or playing tennis or team sports.  Pick activities that you like--ones that make your heart beat faster, your muscles stronger, and your muscles and joints more flexible. If you find more than one thing you like doing, do them all. You don't have to do the same thing every day.  Get your heart pumping every day. Any activity that makes your heart beat faster and keeps it at that rate for a while counts.  Here are some great ways to get your heart beating faster:  Go for a brisk walk, run, or hike.  Go for a swim or bike ride.  Take an online exercise class or dance.  Play a game of touch football, basketball, or soccer.  Play tennis, pickleball, or racquetball.  Climb stairs.  Even some household chores can be aerobic. Just do them at a faster pace. Raking or mowing the lawn, sweeping the garage, and vacuuming and cleaning your home all can help get your heart rate up.  Strengthen your muscles during the week. You don't have to lift heavy weights  "or grow big, bulky muscles to get stronger. Doing a few simple activities that make your muscles work against, or \"resist,\" something can help you get stronger. Aim for at least twice a week.  For example, you can:  Do push-ups or sit-ups, which use your own body weight as resistance.  Lift weights or dumbbells or use stretch bands at home or in a gym or community center.  Stretch your muscles often. Stretching will help you as you become more active. It can help you stay flexible and loosen tight muscles. It can also help improve your balance and posture and can be a great way to relax.  Be sure to stretch the muscles you'll be using when you work out. It's best to warm your muscles slightly before you stretch them. Walk or do some other light aerobic activity for a few minutes. Then start stretching.  When you stretch your muscles:  Do it slowly. Stretching is not about going fast or making sudden movements.  Don't push or bounce during a stretch.  Hold each stretch for at least 15 to 30 seconds, if you can. You should feel a stretch in the muscle, but not pain.  Breathe out as you do the stretch. Then breathe in as you hold the stretch. Don't hold your breath.  If you're worried about how more activity might affect your health, have a checkup before you start. Follow any special advice your doctor gives you for getting a smart start.  Where can you learn more?  Go to https://www.[employer].net/patiented  Enter W332 in the search box to learn more about \"Learning About Being Physically Active.\"  Current as of: July 31, 2024  Content Version: 14.5    2135-9835 [employer].   Care instructions adapted under license by your healthcare professional. If you have questions about a medical condition or this instruction, always ask your healthcare professional. [employer] disclaims any warranty or liability for your use of this information.                Angela Menchaca is a 55 year old, " presenting for the following:  Physical        6/16/2025     1:33 PM   Additional Questions   Roomed by Esther   Accompanied by none         6/16/2025     1:33 PM   Patient Reported Additional Medications   Patient reports taking the following new medications none          HPI    BP Readings from Last 6 Encounters:   06/16/25 (!) 155/93   03/03/25 (!) 153/82   06/06/24 (!) 153/96   02/08/24 (!) 147/87   01/06/23 138/86   12/20/22 (!) 154/96     Wt Readings from Last 4 Encounters:   06/16/25 107.3 kg (236 lb 9.6 oz)   03/03/25 110.7 kg (244 lb)   06/06/24 107.7 kg (237 lb 6.4 oz)   04/05/24 105.7 kg (233 lb)     Diabetes  Poor control  Not checking glucose  Taking lantus 40 units  No significant exercise or diet changes     Hypertension  Bp is elevated at home        Advance Care Planning    Discussed advance care planning with patient; however, patient declined at this time.        6/16/2025   General Health   How would you rate your overall physical health? Good   Feel stress (tense, anxious, or unable to sleep) Not at all         6/16/2025   Nutrition   Three or more servings of calcium each day? Yes   Diet: Regular (no restrictions)   How many servings of fruit and vegetables per day? (!) 2-3   How many sweetened beverages each day? (!) 4+         6/16/2025   Exercise   Days per week of moderate/strenous exercise 3 days   Average minutes spent exercising at this level 30 min         6/16/2025   Social Factors   Frequency of gathering with friends or relatives Three times a week   Worry food won't last until get money to buy more Patient declined   Food not last or not have enough money for food? Patient declined   Do you have housing? (Housing is defined as stable permanent housing and does not include staying outside in a car, in a tent, in an abandoned building, in an overnight shelter, or couch-surfing.) Patient declined   Are you worried about losing your housing? Patient declined   Lack of transportation?  Patient declined   Unable to get utilities (heat,electricity)? Patient declined         6/16/2025   Fall Risk   Fallen 2 or more times in the past year? No   Trouble with walking or balance? No          6/16/2025   Dental   Dentist two times every year? (!) NO           Today's PHQ-2 Score:       3/3/2025    11:10 AM   PHQ-2 ( 1999 [employer])   Q1: Little interest or pleasure in doing things 0    Q2: Feeling down, depressed or hopeless 0    PHQ-2 Score 0    Q1: Little interest or pleasure in doing things Not at all   Q2: Feeling down, depressed or hopeless Not at all   PHQ-2 Score 0       Proxy-reported         6/16/2025   Substance Use   Alcohol more than 3/day or more than 7/wk Yes   How often do you have a drink containing alcohol 2 to 3 times a week   How many alcohol drinks on typical day 5 or 6   How often do you have 5+ drinks at one occasion Weekly   Audit 2/3 Score 5   How often not able to stop drinking once started Never   How often failed to do what normally expected Never   How often needed first drink in am after a heavy drinking session Never   How often feeling of guilt or remorse after drinking Never   How often unable to remember what happened the night before Never   Have you or someone else been injured because of your drinking No   Has anyone been concerned or suggested you cut down on drinking No   TOTAL SCORE - AUDIT 8   Do you use any other substances recreationally? No     Social History     Tobacco Use    Smoking status: Never    Smokeless tobacco: Never   Vaping Use    Vaping status: Never Used           6/16/2025   STI Screening   New sexual partner(s) since last STI/HIV test? No   ASCVD Risk   The ASCVD Risk score (Sreedhar KELLER, et al., 2019) failed to calculate for the following reasons:    Risk score cannot be calculated because patient has a medical history suggesting prior/existing ASCVD            Reviewed and updated as needed this visit by Provider                    BP  "Readings from Last 3 Encounters:   06/16/25 (!) 155/93   03/03/25 (!) 153/82   06/06/24 (!) 153/96    Wt Readings from Last 3 Encounters:   06/16/25 107.3 kg (236 lb 9.6 oz)   03/03/25 110.7 kg (244 lb)   06/06/24 107.7 kg (237 lb 6.4 oz)                      Review of Systems  Constitutional, HEENT, cardiovascular, pulmonary, gi and gu systems are negative, except as otherwise noted.     Objective    Exam  BP (!) 155/93   Pulse 108   Temp 98.2  F (36.8  C) (Temporal)   Resp 16   Ht 1.705 m (5' 7.13\")   Wt 107.3 kg (236 lb 9.6 oz)   SpO2 98%   BMI 36.92 kg/m     Estimated body mass index is 36.92 kg/m  as calculated from the following:    Height as of this encounter: 1.705 m (5' 7.13\").    Weight as of this encounter: 107.3 kg (236 lb 9.6 oz).    Physical Exam          Signed Electronically by: Ravi Prajapati MD    "

## 2025-06-16 NOTE — PATIENT INSTRUCTIONS
Nikolai    It was a pleasure seeing you in clinic today.  Here's the plan:    Bp medication dose increased - follow-up in 1-3 months.  Check pressures.  Mounjaro prescribed - follow-up in 1-3 months to discuss dose increase  Freestyle jade 3 - start using to monitor sugars    Let me know if you have questions.    Ravi Prajapati MD    Patient Education   Preventive Care Advice   This is general advice given by our system to help you stay healthy. However, your care team may have specific advice just for you. Please talk to your care team about your preventive care needs.  Nutrition  Eat 5 or more servings of fruits and vegetables each day.  Try wheat bread, brown rice and whole grain pasta (instead of white bread, rice, and pasta).  Get enough calcium and vitamin D. Check the label on foods and aim for 100% of the RDA (recommended daily allowance).  Lifestyle  Exercise at least 150 minutes each week  (30 minutes a day, 5 days a week).  Do muscle strengthening activities 2 days a week. These help control your weight and prevent disease.  No smoking.  Wear sunscreen to prevent skin cancer.  Have a dental exam and cleaning every 6 months.  Yearly exams  See your health care team every year to talk about:  Any changes in your health.  Any medicines your care team has prescribed.  Preventive care, family planning, and ways to prevent chronic diseases.  Shots (vaccines)   HPV shots (up to age 26), if you've never had them before.  Hepatitis B shots (up to age 59), if you've never had them before.  COVID-19 shot: Get this shot when it's due.  Flu shot: Get a flu shot every year.  Tetanus shot: Get a tetanus shot every 10 years.  Pneumococcal, hepatitis A, and RSV shots: Ask your care team if you need these based on your risk.  Shingles shot (for age 50 and up)  General health tests  Diabetes screening:  Starting at age 35, Get screened for diabetes at least every 3 years.  If you are younger than age 35, ask your care  team if you should be screened for diabetes.  Cholesterol test: At age 39, start having a cholesterol test every 5 years, or more often if advised.  Bone density scan (DEXA): At age 50, ask your care team if you should have this scan for osteoporosis (brittle bones).  Hepatitis C: Get tested at least once in your life.  STIs (sexually transmitted infections)  Before age 24: Ask your care team if you should be screened for STIs.  After age 24: Get screened for STIs if you're at risk. You are at risk for STIs (including HIV) if:  You are sexually active with more than one person.  You don't use condoms every time.  You or a partner was diagnosed with a sexually transmitted infection.  If you are at risk for HIV, ask about PrEP medicine to prevent HIV.  Get tested for HIV at least once in your life, whether you are at risk for HIV or not.  Cancer screening tests  Cervical cancer screening: If you have a cervix, begin getting regular cervical cancer screening tests starting at age 21.  Breast cancer scan (mammogram): If you've ever had breasts, begin having regular mammograms starting at age 40. This is a scan to check for breast cancer.  Colon cancer screening: It is important to start screening for colon cancer at age 45.  Have a colonoscopy test every 10 years (or more often if you're at risk) Or, ask your provider about stool tests like a FIT test every year or Cologuard test every 3 years.  To learn more about your testing options, visit:   .  For help making a decision, visit:   https://bit.ly/gx84227.  Prostate cancer screening test: If you have a prostate, ask your care team if a prostate cancer screening test (PSA) at age 55 is right for you.  Lung cancer screening: If you are a current or former smoker ages 50 to 80, ask your care team if ongoing lung cancer screenings are right for you.  For informational purposes only. Not to replace the advice of your health care provider. Copyright   2023 [facility]  "Services. All rights reserved. Clinically reviewed by the [facility] Transitions Program. [place] 315851 - REV 01/24.  9 Ways to Cut Back on Drinking  Maybe you've found yourself drinking more alcohol than you'd prefer. If you want to cut back, here are some ideas to try.    Think before you drink.  Do you really want a drink, or is it just a habit? If you're used to having a drink at a certain time, try doing something else then.     Look for substitutes.  Find some no-alcohol drinks that you enjoy, like flavored seltzer water, tea with honey, or tonic with a slice of lime. Or try alcohol-free beer or \"virgin\" cocktails (without the alcohol).     Drink more water.  Use water to quench your thirst. Drink a glass of water before you have any alcohol. Have another glass along with every drink or between drinks.     Shrink your drink.  For example, have a bottle of beer instead of a pint. Use a smaller glass for wine. Choose drinks with lower alcohol content (ABV%). Or use less liquor and more mixer in cocktails.     Slow down.  It's easy to drink quickly and without thinking about it. Pay attention, and make each drink last longer.     Do the math.  Total up how much you spend on alcohol each month. How much is that a year? If you cut back, what could you do with the money you save?     Take a break.  Choose a day or two each week when you won't drink at all. Notice how you feel on those days, physically and emotionally. How did you sleep? Do you feel better? Over time, add more break days.     Count calories.  Would you like to lose some weight? For some people that's a good motivator for cutting back. Figure out how many calories are in each drink. How many does that add up to in a day? In a week? In a month?     Practice saying no.  Be ready when someone offers you a drink. Try: \"Thanks, I've had enough.\" Or \"Thanks, but I'm cutting back.\" Or \"No, thanks. I feel better when I drink less.\"   Current as of: " "August 20, 2024  Content Version: 14.5    6777-1688 [employer].   Care instructions adapted under license by your healthcare professional. If you have questions about a medical condition or this instruction, always ask your healthcare professional. [employer] disclaims any warranty or liability for your use of this information.  Eating Healthy Foods: Care Instructions  With every meal, you can make healthy food choices. Try to eat a variety of fruits, vegetables, whole grains, lean proteins, and low-fat dairy products. This can help you get the right balance of nutrients, including vitamins and minerals. Small changes add up over time. You can start by adding one healthy food to your meals each day.    Try to make half your plate fruits and vegetables, one-fourth whole grains, and one-fourth lean proteins. Try including dairy with your meals.   Eat more fruits and vegetables. Try to have them with most meals and snacks.   Foods for healthy eating        Fruits   These can be fresh, frozen, canned, or dried.  Try to choose whole fruit rather than fruit juice.  Eat a variety of colors.        Vegetables   These can be fresh, frozen, canned, or dried.  Beans, peas, and lentils count too.        Whole grains   Choose whole-grain breads, cereals, and noodles.  Try brown rice.        Lean proteins   These can include lean meat, poultry, fish, and eggs.  You can also have tofu, beans, peas, lentils, nuts, and seeds.        Dairy   Try milk, yogurt, and cheese.  Choose low-fat or fat-free when you can.  If you need to, use lactose-free milk or fortified plant-based milk products, such as soy milk.        Water   Drink water when you're thirsty.  Limit sugar-sweetened drinks, including soda, fruit drinks, and sports drinks.  Where can you learn more?  Go to https://www.healthwise.net/patiented  Enter T756 in the search box to learn more about \"Eating Healthy Foods: Care Instructions.\"  Current as of: " "October 7, 2024  Content Version: 14.5    1816-7248 [employer].   Care instructions adapted under license by your healthcare professional. If you have questions about a medical condition or this instruction, always ask your healthcare professional. [employer] disclaims any warranty or liability for your use of this information.    Learning About Being Physically Active  What is physical activity?     Being physically active means doing any kind of activity that gets your body moving.  The types of physical activity that can help you get fit and stay healthy include:  Aerobic or \"cardio\" activities. These make your heart beat faster and make you breathe harder, such as brisk walking, riding a bike, or running. They strengthen your heart and lungs and build up your endurance.  Strength training activities. These make your muscles work against, or \"resist,\" something. Examples include lifting weights or doing push-ups. These activities help tone and strengthen your muscles and bones.  Stretches. These let you move your joints and muscles through their full range of motion. Stretching helps you be more flexible.  Reaching a balance between these three types of physical activity is important because each one contributes to your overall fitness.  What are the benefits of being active?  Being active is one of the best things you can do for your health. It helps you to:  Feel stronger and have more energy to do all the things you like to do.  Focus better at school or work.  Feel, think, and sleep better.  Reach and stay at a healthy weight.  Lose fat and build lean muscle.  Lower your risk for serious health problems, including diabetes, heart attack, high blood pressure, and some cancers.  Keep your heart, lungs, bones, muscles, and joints strong and healthy.  How can you make being active part of your life?  Start slowly. Make it your long-term goal to get at least 30 minutes of exercise on most " "days of the week. Walking is a good choice. You also may want to do other activities, such as running, swimming, cycling, or playing tennis or team sports.  Pick activities that you like--ones that make your heart beat faster, your muscles stronger, and your muscles and joints more flexible. If you find more than one thing you like doing, do them all. You don't have to do the same thing every day.  Get your heart pumping every day. Any activity that makes your heart beat faster and keeps it at that rate for a while counts.  Here are some great ways to get your heart beating faster:  Go for a brisk walk, run, or hike.  Go for a swim or bike ride.  Take an online exercise class or dance.  Play a game of touch football, basketball, or soccer.  Play tennis, pickleball, or racquetball.  Climb stairs.  Even some household chores can be aerobic. Just do them at a faster pace. Raking or mowing the lawn, sweeping the garage, and vacuuming and cleaning your home all can help get your heart rate up.  Strengthen your muscles during the week. You don't have to lift heavy weights or grow big, bulky muscles to get stronger. Doing a few simple activities that make your muscles work against, or \"resist,\" something can help you get stronger. Aim for at least twice a week.  For example, you can:  Do push-ups or sit-ups, which use your own body weight as resistance.  Lift weights or dumbbells or use stretch bands at home or in a gym or community center.  Stretch your muscles often. Stretching will help you as you become more active. It can help you stay flexible and loosen tight muscles. It can also help improve your balance and posture and can be a great way to relax.  Be sure to stretch the muscles you'll be using when you work out. It's best to warm your muscles slightly before you stretch them. Walk or do some other light aerobic activity for a few minutes. Then start stretching.  When you stretch your muscles:  Do it slowly. " "Stretching is not about going fast or making sudden movements.  Don't push or bounce during a stretch.  Hold each stretch for at least 15 to 30 seconds, if you can. You should feel a stretch in the muscle, but not pain.  Breathe out as you do the stretch. Then breathe in as you hold the stretch. Don't hold your breath.  If you're worried about how more activity might affect your health, have a checkup before you start. Follow any special advice your doctor gives you for getting a smart start.  Where can you learn more?  Go to https://www.[employer].net/patiented  Enter W332 in the search box to learn more about \"Learning About Being Physically Active.\"  Current as of: July 31, 2024  Content Version: 14.5 2024-2025 [employer].   Care instructions adapted under license by your healthcare professional. If you have questions about a medical condition or this instruction, always ask your healthcare professional. [employer] disclaims any warranty or liability for your use of this information.       "

## 2025-06-17 ENCOUNTER — RESULTS FOLLOW-UP (OUTPATIENT)
Dept: FAMILY MEDICINE | Facility: CLINIC | Age: 55
End: 2025-06-17

## 2025-06-17 ENCOUNTER — PATIENT OUTREACH (OUTPATIENT)
Dept: CARE COORDINATION | Facility: CLINIC | Age: 55
End: 2025-06-17
Payer: COMMERCIAL

## 2025-06-17 DIAGNOSIS — E11.9 TYPE 2 DIABETES MELLITUS WITHOUT COMPLICATION, WITHOUT LONG-TERM CURRENT USE OF INSULIN (H): Primary | ICD-10-CM

## 2025-06-17 LAB — ALDOST SERPL-MCNC: 26.3 NG/DL (ref 0–31)

## 2025-06-18 ENCOUNTER — PATIENT OUTREACH (OUTPATIENT)
Dept: CARE COORDINATION | Facility: CLINIC | Age: 55
End: 2025-06-18
Payer: COMMERCIAL

## 2025-06-19 ENCOUNTER — PATIENT OUTREACH (OUTPATIENT)
Dept: CARE COORDINATION | Facility: CLINIC | Age: 55
End: 2025-06-19
Payer: COMMERCIAL

## 2025-06-19 LAB — RENIN PLAS-CCNC: 5.3 NG/ML/HR

## 2025-06-30 ENCOUNTER — TELEPHONE (OUTPATIENT)
Dept: FAMILY MEDICINE | Facility: CLINIC | Age: 55
End: 2025-06-30
Payer: COMMERCIAL

## 2025-06-30 NOTE — TELEPHONE ENCOUNTER
Prior Authorization Retail Medication Request    Medication/Dose: Continuous Glucose Sensor (FREESTYLE MIRELLA 3 PLUS SENSOR) MISC  Diagnosis and ICD code (if different than what is on RX):    New/renewal/insurance change PA/secondary ins. PA:  Previously Tried and Failed:    Rationale:      Insurance   Primary:   Insurance ID:      Secondary (if applicable):  Insurance ID:      Pharmacy Information (if different than what is on RX)  Name:    Phone:    Fax:    Clinic Information  Preferred routing pool for dept communication: [facility]

## 2025-07-01 ENCOUNTER — TELEPHONE (OUTPATIENT)
Dept: GASTROENTEROLOGY | Facility: CLINIC | Age: 55
End: 2025-07-01
Payer: COMMERCIAL

## 2025-07-01 NOTE — TELEPHONE ENCOUNTER
"Endoscopy Scheduling Screen    Patient wants to schedule in [place]--sent to [facility]      Caller: patient    Have you had any respiratory illness or flu-like symptoms in the last 10 days?  No    Patient is ACTIVE on [employer].  Inform patient that all appointment instructions will be sent via [employer].    Review patient's insurance for any non participating payor.    Ordering/Referring Provider:   YUVAL ACEVEDO    (If ordering provider performs procedure, schedule with ordering provider unless otherwise instructed. )    BMI: Estimated body mass index is 36.92 kg/m  as calculated from the following:    Height as of 6/16/25: 1.705 m (5' 7.13\").    Weight as of 6/16/25: 107.3 kg (236 lb 9.6 oz).     Sedation Ordered  moderate sedation.   If patient BMI > 50 do not schedule in [facility].    If patient BMI > 45 do not schedule at [facility].    Are you taking methadone or Suboxone?  NO, No RN review required.    Have you been diagnosed and are being treated for severe PTSD or severe anxiety?  NO, No RN review required.    Are you taking any prescription medications for pain 3 or more times per week?   NO, No RN review required.    Do you have a history of malignant hyperthermia?  No    (Females) Are you currently pregnant?   No     Have you been diagnosed or told you have pulmonary hypertension?   No    Do you have an LVAD?  No    Have you been told you have moderate to severe sleep apnea?  No.    Have you been told you have COPD, asthma, or any other lung disease?  No    Has your doctor ordered any cardiac tests like echo, angiogram, stress test, ablation, or EKG, that you have not completed yet?  No    Do you  have a history of any heart conditions?  No     Have you ever had or are you waiting for an organ transplant?  No. Continue scheduling, no site restrictions.    Have you had a stroke or transient ischemic attack (TIA aka \"mini stroke\") in the last 2 years?   No.    Have you been diagnosed with or been told " "you have cirrhosis of the liver?   No.    Are you currently on dialysis?   No    Do you need assistance transferring?   No    BMI: Estimated body mass index is 36.92 kg/m  as calculated from the following:    Height as of 6/16/25: 1.705 m (5' 7.13\").    Weight as of 6/16/25: 107.3 kg (236 lb 9.6 oz).     Is patients BMI > 40 and scheduling location [facility]?  No    Do you take an injectable or oral medication for weight loss or diabetes (excluding insulin)?  Yes, hold time can be up to 7 days. Please consult with you prescribing provider to discuss endoscopy recommendations. (Please schedule at least 7 days out.)    Do you take the medication Naltrexone?  No    Do you take blood thinners?  Yes, you must contact your prescribing provider for directions on holding or bridging with a different medication.       Prep   Are you currently on dialysis or do you have chronic kidney disease?  No    Do you have a diagnosis of diabetes?  Yes (Golytely Prep)    Do you have a diagnosis of cystic fibrosis (CF)?  No    On a regular basis do you go 3 -5 days between bowel movements?  No    BMI > 40?  No    Preferred Pharmacy:    [id number] [place] [address] [facility]  [id number] [place] [address]  [facility] [address]  Phone: [phone] Fax: [phone]      Final Scheduling Details     Procedure scheduled  Colonoscopy    Surgeon:       Date of procedure:       Pre-OP / PAC:   No - Not required for this site.    Location       Sedation          Patient Reminders:   You will receive a call from a Nurse to review instructions and health history.  This assessment must be completed prior to your procedure.  Failure to complete the Nurse assessment may result in the procedure being cancelled.      On the day of your procedure, please designate an adult(s) who can drive you home stay with you for the next 24 hours. The medicines used in the exam will make you sleepy. You will " not be able to drive.      You cannot take public transportation, ride share services, or non-medical taxi service without a responsible caregiver.  Medical transport services are allowed with the requirement that a responsible caregiver will receive you at your destination.  We require that drivers and caregivers are confirmed prior to your procedure.

## 2025-07-02 NOTE — TELEPHONE ENCOUNTER
Retail Pharmacy Prior Authorization Team   Phone: [phone]    PA Initiation    Medication: FREESTYLE MIRELLA 3 PLUS SENSOR MISC  Insurance Company: [employer] ([facility]) - Phone [phone] Fax [phone]  Pharmacy Filling the Rx: [id number] - [place] - [address] [facility] [address]  Filling Pharmacy Phone: [phone]  Filling Pharmacy Fax:    Start Date: 7/1/2025    BANDAR SOLER (Carl: [id number])

## 2025-07-07 NOTE — TELEPHONE ENCOUNTER
Prior Authorization Approval    Medication: FREESTYLE MIRELLA 3 PLUS SENSOR MISC  Authorization Effective Date: 7/3/2025  Authorization Expiration Date: 12/31/2025  Insurance Company: [employer] ([facility]) - Phone [phone] Fax [phone]  Which Pharmacy is filling the prescription: [id number] - [place], [place] - [address] [address] [facility]  Pharmacy Notified: YES  Patient Notified: YES (faxed approval letter to pharmacy and notified patient via mychart message)

## 2025-07-08 ENCOUNTER — TELEPHONE (OUTPATIENT)
Dept: FAMILY MEDICINE | Facility: CLINIC | Age: 55
End: 2025-07-08
Payer: COMMERCIAL

## 2025-07-08 NOTE — TELEPHONE ENCOUNTER
Patient Quality Outreach    Patient is due for the following:   Diabetes -  Eye Exam and Foot Exam  Hypertension -  BP check  Colon Cancer Screening      Topic Date Due    Pneumococcal Vaccine (2 of 2 - PCV) 03/03/2018    Zoster (Shingles) Vaccine (1 of 2) Never done    COVID-19 Vaccine (4 - 2024-25 season) 09/01/2024       Action(s) Taken:   No follow up needed at this time.  Patient was recently seen and was asked to follow up in 1-3 months    Type of outreach:    Chart review performed, no outreach needed.    Questions for provider review:    None         Esther Rincon CMA  Chart routed to None.

## 2025-07-14 DIAGNOSIS — I10 UNCONTROLLED HYPERTENSION: ICD-10-CM

## 2025-07-14 DIAGNOSIS — E11.59 TYPE 2 DIABETES MELLITUS WITH OTHER CIRCULATORY COMPLICATION, WITHOUT LONG-TERM CURRENT USE OF INSULIN (H): ICD-10-CM

## 2025-07-14 RX ORDER — NIFEDIPINE 90 MG/1
90 TABLET, EXTENDED RELEASE ORAL DAILY
Qty: 90 TABLET | Refills: 1 | Status: SHIPPED | OUTPATIENT
Start: 2025-07-14

## 2025-07-14 RX ORDER — CLOPIDOGREL BISULFATE 75 MG/1
TABLET ORAL
Qty: 90 TABLET | Refills: 1 | Status: SHIPPED | OUTPATIENT
Start: 2025-07-14

## 2025-07-24 PROBLEM — R41.89 DISORGANIZED THINKING: Status: RESOLVED | Noted: 2024-06-06 | Resolved: 2025-07-24

## 2025-07-24 PROBLEM — I16.1 HYPERTENSIVE EMERGENCY: Status: RESOLVED | Noted: 2022-12-12 | Resolved: 2025-07-24

## 2025-07-24 PROBLEM — R47.89 WORD FINDING DIFFICULTY: Status: RESOLVED | Noted: 2024-06-06 | Resolved: 2025-07-24

## 2025-07-24 PROBLEM — Z86.73 HISTORY OF CVA (CEREBROVASCULAR ACCIDENT): Status: ACTIVE | Noted: 2025-07-24

## 2025-07-24 PROBLEM — I63.9 ACUTE CVA (CEREBROVASCULAR ACCIDENT) (H): Status: RESOLVED | Noted: 2022-12-12 | Resolved: 2025-07-24

## 2025-07-30 PROBLEM — E78.5 HYPERLIPIDEMIA WITH TARGET LDL LESS THAN 70: Status: ACTIVE | Noted: 2017-03-03

## 2025-08-26 DIAGNOSIS — E11.59 TYPE 2 DIABETES MELLITUS WITH OTHER CIRCULATORY COMPLICATION, WITHOUT LONG-TERM CURRENT USE OF INSULIN (H): ICD-10-CM

## 2025-08-27 RX ORDER — PEN NEEDLE, DIABETIC 32GX 5/32"
NEEDLE, DISPOSABLE MISCELLANEOUS
Qty: 200 EACH | Refills: 3 | Status: SHIPPED | OUTPATIENT
Start: 2025-08-27